# Patient Record
Sex: MALE | Race: WHITE | Employment: OTHER | URBAN - METROPOLITAN AREA
[De-identification: names, ages, dates, MRNs, and addresses within clinical notes are randomized per-mention and may not be internally consistent; named-entity substitution may affect disease eponyms.]

---

## 2018-07-16 ENCOUNTER — APPOINTMENT (EMERGENCY)
Dept: RADIOLOGY | Facility: HOSPITAL | Age: 83
DRG: 481 | End: 2018-07-16
Payer: MEDICARE

## 2018-07-16 ENCOUNTER — APPOINTMENT (EMERGENCY)
Dept: CT IMAGING | Facility: HOSPITAL | Age: 83
DRG: 481 | End: 2018-07-16
Payer: MEDICARE

## 2018-07-16 ENCOUNTER — HOSPITAL ENCOUNTER (INPATIENT)
Facility: HOSPITAL | Age: 83
LOS: 7 days | Discharge: NURSING, REHAB OR LONG-TERM CARE | DRG: 481 | End: 2018-07-23
Attending: HOSPITALIST | Admitting: HOSPITALIST
Payer: MEDICARE

## 2018-07-16 ENCOUNTER — HOSPITAL ENCOUNTER (EMERGENCY)
Facility: HOSPITAL | Age: 83
DRG: 481 | End: 2018-07-16
Attending: EMERGENCY MEDICINE
Payer: MEDICARE

## 2018-07-16 VITALS
TEMPERATURE: 98.9 F | SYSTOLIC BLOOD PRESSURE: 98 MMHG | HEIGHT: 66 IN | RESPIRATION RATE: 18 BRPM | WEIGHT: 175 LBS | OXYGEN SATURATION: 96 % | DIASTOLIC BLOOD PRESSURE: 64 MMHG | BODY MASS INDEX: 28.12 KG/M2 | HEART RATE: 64 BPM

## 2018-07-16 DIAGNOSIS — N17.9 AKI (ACUTE KIDNEY INJURY) (HCC): ICD-10-CM

## 2018-07-16 DIAGNOSIS — S30.22XA SCROTAL HEMATOMA: ICD-10-CM

## 2018-07-16 DIAGNOSIS — S72.141A CLOSED DISPLACED INTERTROCHANTERIC FRACTURE OF RIGHT FEMUR, INITIAL ENCOUNTER (HCC): ICD-10-CM

## 2018-07-16 DIAGNOSIS — S72.001A HIP FRACTURE REQUIRING OPERATIVE REPAIR, RIGHT, CLOSED, INITIAL ENCOUNTER (HCC): Primary | ICD-10-CM
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
## 2018-07-16 DIAGNOSIS — S72.001A CLOSED RIGHT HIP FRACTURE, INITIAL ENCOUNTER (HCC): Primary | ICD-10-CM

## 2018-07-16 PROBLEM — E78.5 DYSLIPIDEMIA: Status: ACTIVE | Noted: 2018-07-16

## 2018-07-16 PROBLEM — F02.80 EARLY ONSET ALZHEIMER'S DEMENTIA WITHOUT BEHAVIORAL DISTURBANCE (HCC): Status: ACTIVE | Noted: 2018-07-16

## 2018-07-16 PROBLEM — Z01.818 PRE-OP EVALUATION: Status: ACTIVE | Noted: 2018-07-16

## 2018-07-16 PROBLEM — D72.829 LEUKOCYTOSIS: Status: ACTIVE | Noted: 2018-07-16

## 2018-07-16 PROBLEM — E03.9 ACQUIRED HYPOTHYROIDISM: Status: ACTIVE | Noted: 2018-07-16

## 2018-07-16 PROBLEM — E87.1 HYPONATREMIA: Status: ACTIVE | Noted: 2018-07-16

## 2018-07-16 PROBLEM — G30.0 EARLY ONSET ALZHEIMER'S DEMENTIA WITHOUT BEHAVIORAL DISTURBANCE (HCC): Status: ACTIVE | Noted: 2018-07-16

## 2018-07-16 LAB
ALBUMIN SERPL BCP-MCNC: 3.8 G/DL (ref 3.5–5.7)
ALP SERPL-CCNC: 122 U/L (ref 55–165)
ALT SERPL W P-5'-P-CCNC: 18 U/L (ref 7–52)
ANION GAP SERPL CALCULATED.3IONS-SCNC: 7 MMOL/L (ref 4–13)
AST SERPL W P-5'-P-CCNC: 20 U/L (ref 13–39)
BASOPHILS # BLD AUTO: 0 THOUSANDS/ΜL (ref 0–0.1)
BASOPHILS NFR BLD AUTO: 0 % (ref 0–1)
BILIRUB SERPL-MCNC: 0.5 MG/DL (ref 0.2–1)
BUN SERPL-MCNC: 20 MG/DL (ref 7–25)
CALCIUM SERPL-MCNC: 9.5 MG/DL (ref 8.6–10.5)
CHLORIDE SERPL-SCNC: 95 MMOL/L (ref 98–107)
CO2 SERPL-SCNC: 28 MMOL/L (ref 21–31)
CREAT SERPL-MCNC: 1.14 MG/DL (ref 0.7–1.3)
EOSINOPHIL # BLD AUTO: 0.1 THOUSAND/ΜL (ref 0–0.61)
EOSINOPHIL NFR BLD AUTO: 1 % (ref 0–6)
ERYTHROCYTE [DISTWIDTH] IN BLOOD BY AUTOMATED COUNT: 14.5 % (ref 11.6–15.1)
GFR SERPL CREATININE-BSD FRML MDRD: 60 ML/MIN/1.73SQ M
GLUCOSE SERPL-MCNC: 176 MG/DL (ref 65–140)
HCT VFR BLD AUTO: 42 % (ref 42–52)
HGB BLD-MCNC: 14.5 G/DL (ref 12–17)
LYMPHOCYTES # BLD AUTO: 0.8 THOUSANDS/ΜL (ref 0.6–4.47)
LYMPHOCYTES NFR BLD AUTO: 5 % (ref 14–44)
MCH RBC QN AUTO: 32 PG (ref 26.8–34.3)
MCHC RBC AUTO-ENTMCNC: 34.4 G/DL (ref 31.4–37.4)
MCV RBC AUTO: 93 FL (ref 82–98)
MONOCYTES # BLD AUTO: 0.7 THOUSAND/ΜL (ref 0.17–1.22)
MONOCYTES NFR BLD AUTO: 5 % (ref 4–12)
NEUTROPHILS # BLD AUTO: 13.2 THOUSANDS/ΜL (ref 1.85–7.62)
NEUTS SEG NFR BLD AUTO: 89 % (ref 43–75)
NRBC BLD AUTO-RTO: 0 /100 WBCS
PLATELET # BLD AUTO: 275 THOUSANDS/UL (ref 149–390)
PMV BLD AUTO: 7.1 FL (ref 8.9–12.7)
POTASSIUM SERPL-SCNC: 4.6 MMOL/L (ref 3.5–5.5)
PROT SERPL-MCNC: 6.6 G/DL (ref 6.4–8.9)
RBC # BLD AUTO: 4.52 MILLION/UL (ref 3.88–5.62)
SODIUM SERPL-SCNC: 130 MMOL/L (ref 134–143)
WBC # BLD AUTO: 14.8 THOUSAND/UL (ref 4.31–10.16)

## 2018-07-16 PROCEDURE — 73552 X-RAY EXAM OF FEMUR 2/>: CPT

## 2018-07-16 PROCEDURE — 99223 1ST HOSP IP/OBS HIGH 75: CPT | Performed by: HOSPITALIST

## 2018-07-16 PROCEDURE — 70450 CT HEAD/BRAIN W/O DYE: CPT

## 2018-07-16 PROCEDURE — 85025 COMPLETE CBC W/AUTO DIFF WBC: CPT | Performed by: EMERGENCY MEDICINE

## 2018-07-16 PROCEDURE — 71250 CT THORAX DX C-: CPT

## 2018-07-16 PROCEDURE — 96375 TX/PRO/DX INJ NEW DRUG ADDON: CPT

## 2018-07-16 PROCEDURE — 96374 THER/PROPH/DIAG INJ IV PUSH: CPT

## 2018-07-16 PROCEDURE — 80053 COMPREHEN METABOLIC PANEL: CPT | Performed by: EMERGENCY MEDICINE

## 2018-07-16 PROCEDURE — 96376 TX/PRO/DX INJ SAME DRUG ADON: CPT

## 2018-07-16 PROCEDURE — 74176 CT ABD & PELVIS W/O CONTRAST: CPT

## 2018-07-16 PROCEDURE — 36415 COLL VENOUS BLD VENIPUNCTURE: CPT | Performed by: EMERGENCY MEDICINE

## 2018-07-16 PROCEDURE — 99285 EMERGENCY DEPT VISIT HI MDM: CPT

## 2018-07-16 RX ORDER — EZETIMIBE 10 MG/1
10 TABLET ORAL DAILY
COMMUNITY

## 2018-07-16 RX ORDER — ASPIRIN 81 MG/1
81 TABLET, CHEWABLE ORAL
COMMUNITY
Start: 2017-12-02

## 2018-07-16 RX ORDER — MORPHINE SULFATE 2 MG/ML
2 INJECTION, SOLUTION INTRAMUSCULAR; INTRAVENOUS EVERY 4 HOURS PRN
Status: DISCONTINUED | OUTPATIENT
Start: 2018-07-16 | End: 2018-07-16 | Stop reason: HOSPADM

## 2018-07-16 RX ORDER — BISACODYL 10 MG
10 SUPPOSITORY, RECTAL RECTAL EVERY 24 HOURS
Status: DISCONTINUED | OUTPATIENT
Start: 2018-07-16 | End: 2018-07-23 | Stop reason: HOSPADM

## 2018-07-16 RX ORDER — HYDROCODONE BITARTRATE AND ACETAMINOPHEN 5; 325 MG/1; MG/1
1 TABLET ORAL EVERY 6 HOURS PRN
Status: DISCONTINUED | OUTPATIENT
Start: 2018-07-16 | End: 2018-07-23 | Stop reason: HOSPADM

## 2018-07-16 RX ORDER — MORPHINE SULFATE 2 MG/ML
2 INJECTION, SOLUTION INTRAMUSCULAR; INTRAVENOUS ONCE
Status: COMPLETED | OUTPATIENT
Start: 2018-07-16 | End: 2018-07-16

## 2018-07-16 RX ORDER — LEVOTHYROXINE SODIUM 88 UG/1
88 TABLET ORAL
COMMUNITY
Start: 2017-12-02

## 2018-07-16 RX ORDER — ONDANSETRON 2 MG/ML
4 INJECTION INTRAMUSCULAR; INTRAVENOUS EVERY 6 HOURS PRN
Status: DISCONTINUED | OUTPATIENT
Start: 2018-07-16 | End: 2018-07-23 | Stop reason: HOSPADM

## 2018-07-16 RX ORDER — DOCUSATE SODIUM 100 MG/1
100 CAPSULE, LIQUID FILLED ORAL 2 TIMES DAILY
Status: DISCONTINUED | OUTPATIENT
Start: 2018-07-16 | End: 2018-07-23 | Stop reason: HOSPADM

## 2018-07-16 RX ORDER — MORPHINE SULFATE 2 MG/ML
2 INJECTION, SOLUTION INTRAMUSCULAR; INTRAVENOUS EVERY 4 HOURS PRN
Status: DISCONTINUED | OUTPATIENT
Start: 2018-07-16 | End: 2018-07-17

## 2018-07-16 RX ORDER — EZETIMIBE 10 MG/1
10 TABLET ORAL DAILY
Status: DISCONTINUED | OUTPATIENT
Start: 2018-07-17 | End: 2018-07-23 | Stop reason: HOSPADM

## 2018-07-16 RX ORDER — SODIUM CHLORIDE 9 MG/ML
75 INJECTION, SOLUTION INTRAVENOUS CONTINUOUS
Status: DISCONTINUED | OUTPATIENT
Start: 2018-07-16 | End: 2018-07-18

## 2018-07-16 RX ORDER — LEVOTHYROXINE SODIUM 88 UG/1
88 TABLET ORAL
Status: DISCONTINUED | OUTPATIENT
Start: 2018-07-17 | End: 2018-07-23 | Stop reason: HOSPADM

## 2018-07-16 RX ORDER — ACETAMINOPHEN 500 MG
500 TABLET ORAL EVERY 6 HOURS
COMMUNITY
Start: 2017-12-02

## 2018-07-16 RX ORDER — ACETAMINOPHEN 325 MG/1
650 TABLET ORAL EVERY 6 HOURS PRN
Status: DISCONTINUED | OUTPATIENT
Start: 2018-07-16 | End: 2018-07-23 | Stop reason: HOSPADM

## 2018-07-16 RX ORDER — BISACODYL 10 MG
10 SUPPOSITORY, RECTAL RECTAL EVERY 24 HOURS
COMMUNITY
Start: 2017-12-02

## 2018-07-16 RX ORDER — ONDANSETRON 2 MG/ML
4 INJECTION INTRAMUSCULAR; INTRAVENOUS ONCE
Status: COMPLETED | OUTPATIENT
Start: 2018-07-16 | End: 2018-07-16

## 2018-07-16 RX ADMIN — SODIUM CHLORIDE 75 ML/HR: 9 INJECTION, SOLUTION INTRAVENOUS at 20:31

## 2018-07-16 RX ADMIN — ONDANSETRON 4 MG: 2 INJECTION, SOLUTION INTRAMUSCULAR; INTRAVENOUS at 15:02

## 2018-07-16 RX ADMIN — MORPHINE SULFATE 2 MG: 2 INJECTION, SOLUTION INTRAMUSCULAR; INTRAVENOUS at 20:46

## 2018-07-16 RX ADMIN — MORPHINE SULFATE 2 MG: 2 INJECTION, SOLUTION INTRAMUSCULAR; INTRAVENOUS at 18:08

## 2018-07-16 RX ADMIN — BISACODYL 10 MG: 10 SUPPOSITORY RECTAL at 20:31

## 2018-07-16 RX ADMIN — MORPHINE SULFATE 2 MG: 2 INJECTION, SOLUTION INTRAMUSCULAR; INTRAVENOUS at 15:03

## 2018-07-16 NOTE — ED PROVIDER NOTES
History  Chief Complaint   Patient presents with    Fall     EMS reports that the pt is a resident at [facility] and that he fell around 1000 today; upon arrival, EMS noticed pt was siting in a wheelchair with staff assisting him with a meal; when getting the pt on the stretcher, they noticed that the pt's right leg was shortened and externally rotated; pt unable to communicate when pain is locatedc     Patient comes to us in the emergency department by EMS with complaint of falling in the shower approximately 10 with 10 30 this morning  Patient is nonverbal and cannot give any reports of pain or review of systems at this time  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 Taking?    Multiple Vitamins-Minerals (MULTIVITAMIN ADULTS PO)   Yes Yes   Sig: Take 1 capsule by mouth   acetaminophen (TYLENOL) 500 mg tablet   Yes Yes   Sig: Take 500 mg by mouth every 6 (six) hours   aspirin 81 mg chewable tablet   Yes Yes   Sig: Chew 81 mg   bisacodyl (DULCOLAX) 10 mg suppository   Yes Yes   Sig: Insert 10 mg into the rectum every 24 hours   ezetimibe (ZETIA) 10 mg tablet   Yes Yes   Sig: Take 10 mg by mouth daily   levothyroxine 88 mcg tablet   Yes Yes   Sig: Take 88 mcg by mouth      Facility-Administered Medications: None       Past Medical History:   Diagnosis Date    Acute kidney failure (HCC)     Cardiac disease     [facility]cere[facility] [address] [facility]     Disease of thyroid gland     hypothyroid    Sacral fracture ([facility] [address] )        History reviewed  No pertinent surgical history  History reviewed  No pertinent family history  I have reviewed and agree with the history as documented  Social History   Substance Use Topics    Smoking status: Unknown If Ever Smoked    Smokeless tobacco: Never Used    Alcohol use No        Review of Systems   Unable to perform ROS: Other (non-verbal following CVA in past)       Physical Exam  Physical Exam   Constitutional: He appears well-developed and well-nourished  HENT:   Nose: Nose normal    Mouth/Throat: Oropharynx is clear and moist  No oropharyngeal exudate  Eyes: Conjunctivae and EOM are normal  Pupils are equal, round, and reactive to light  No scleral icterus  Neck: Normal range of motion  Neck supple  No JVD present  No tracheal deviation present  Cardiovascular: Normal rate, regular rhythm and normal heart sounds  No murmur heard  Pulmonary/Chest: Effort normal and breath sounds normal  No respiratory distress  He has no wheezes  He has no rales  Abdominal: Soft  Bowel sounds are normal  There is no tenderness  There is no guarding  Musculoskeletal: He exhibits edema, tenderness and deformity  Right leg is held in external rotation and flexion and shortened  There is edema of the proximal to mid right thigh with exquisite tenderness with palpation or any attempted range of motion  The right lower leg and foot are neurovascularly intact  Neurological:   The patient is unable to cooperate for a neurologic exam   Skin: Skin is warm and dry  Psychiatric: He has a normal mood and affect  His behavior is normal    Nursing note and vitals reviewed        Vital Signs  ED Triage Vitals [07/16/18 1424]   Temperature Pulse Respirations Blood Pressure SpO2   (!) 95 8 °F (35 4 °C) 66 18 123/71 96 %      Temp Source Heart Rate Source Patient Position - Orthostatic VS BP Location FiO2 (%)   Tympanic Monitor Lying Right arm -- Pain Score       --           Vitals:    07/16/18 1424 07/16/18 1819   BP: 123/71 98/64   Pulse: 66 64   Patient Position - Orthostatic VS: Lying Lying       Visual Acuity      ED Medications  Medications   morphine injection 2 mg (2 mg Intravenous Given 7/16/18 1503)   ondansetron (ZOFRAN) injection 4 mg (4 mg Intravenous Given 7/16/18 1502)   morphine injection 2 mg (2 mg Intravenous Given 7/16/18 1808)       Diagnostic Studies  Results Reviewed     Procedure Component Value Units Date/Time    Comprehensive metabolic panel [[id number]]  (Abnormal) Collected:  07/16/18 1458    Lab Status:  Final result Specimen:  Blood from Arm, Right Updated:  07/16/18 1540     Sodium 130 (L) mmol/L      Potassium 4 6 mmol/L      Chloride 95 (L) mmol/L      CO2 28 mmol/L      Anion Gap 7 mmol/L      BUN 20 mg/dL      Creatinine 1 14 mg/dL      Glucose 176 (H) mg/dL      Calcium 9 5 mg/dL      AST 20 U/L      ALT 18 U/L      Alkaline Phosphatase 122 U/L      Total Protein 6 6 g/dL      Albumin 3 8 g/dL      Total Bilirubin 0 50 mg/dL      eGFR 60 ml/min/1 73sq m     Narrative:         National Kidney Disease Education Program recommendations are as follows:  GFR calculation is accurate only with a steady state creatinine  Chronic Kidney disease less than 60 ml/min/1 73 sq  meters  Kidney failure less than 15 ml/min/1 73 sq  meters      CBC and differential [[id number]]  (Abnormal) Collected:  07/16/18 1458    Lab Status:  Final result Specimen:  Blood from Arm, Right Updated:  07/16/18 1540     WBC 14 80 (H) Thousand/uL      RBC 4 52 Million/uL      Hemoglobin 14 5 g/dL      Hematocrit 42 0 %      MCV 93 fL      MCH 32 0 pg      MCHC 34 4 g/dL      RDW 14 5 %      MPV 7 1 (L) fL      Platelets 367 Thousands/uL      nRBC 0 /100 WBCs      Neutrophils Relative 89 (H) %      Lymphocytes Relative 5 (L) %      Monocytes Relative 5 %      Eosinophils Relative 1 %      Basophils Relative 0 %      Neutrophils Absolute 13 20 (H) Thousands/µL Lymphocytes Absolute 0 80 Thousands/µL      Monocytes Absolute 0 70 Thousand/µL      Eosinophils Absolute 0 10 Thousand/µL      Basophils Absolute 0 00 Thousands/µL     UA w Reflex to Microscopic w Reflex to Culture [[id number]]     Lab Status:  No result Specimen:  Urine                  CT chest abdomen pelvis wo contrast   Final Result by Horace Ragsdale (07/16 1715)   Intertrochanteric fracture of the right femur  No other evidence of acute traumatic injury  Limited without contrast    Cholelithiasis incidentally noted  Signed by SVETA Hopkins       XR femur 2 views RIGHT   Final Result by Nora Paniagua MD (07/16 1709)   Complete comminuted intertrochanteric proximal femoral fracture as   described with mild lateral displacement without dislocation  Signed by Nora Paniagua MD      CT head without contrast   Final Result by Horace Ragsdale (07/16 1657)   No acute intracranial findings  If symptoms persist or if further imaging   is clinically indicated, consider follow-up CT or MRI  Low attenuation in the white matter bilaterally, age indeterminate  This   is most commonly from small vessel ischemic disease         Signed by SVETA Hopkins  Procedures  Procedures       Phone Contacts  ED Phone Contact    ED Course  ED Course as of Jul 16 1839 Mon Jul 16, 2018   1617 XR femur 2 views RIGHT   1622 Right femur x-ray reviewed by me  Order for transfer admission for hip fracture made  We await PAC call  [address] Discussed with Dr Gerhardt Dade at [facility], patient accepted but call made to Dr Simona Dolan, orthopedics, for further sign out  Patient's family member is in the examination room at this time and aware of plan  [phone] CT chest abdomen pelvis wo contrast   1717 Case discussed with Dr Simona Dolan, orthopedics, he will see patient in consult at [facility]  Patient's family is aware      1746 Case discussed with Marko Gandhi, she has a room and will call when transportation arrangements have been made  MDM  CritCare Time    Disposition  Final diagnoses:   Hip fracture requiring operative repair, right, closed, initial encounter [facility])   Fall, initial encounter     Time reflects when diagnosis was documented in both MDM as applicable and the Disposition within this note     Time User Action Codes Description Comment    7/16/2018  5:21 PM Elba Embs Add [S72 001A] Hip fracture requiring operative repair, right, closed, initial encounter ([facility] [address] )     7/16/2018  5:21 PM Elba Embs Add Simeon Esqueda  DANESHEA] Fall, initial encounter       ED Disposition     ED Disposition Condition Comment    Transfer to Another [address] should be transferred out to [facility] in [place], [place]       MD Documentation      Most Recent Value   Patient Condition  The patient has been stabilized such that within reasonable medical probability, no material deterioration of the patient condition or the condition of the unborn child(hugo) is likely to result from the transfer   Reason for Transfer  Level of Care needed not available at this facility   Benefits of Transfer  Specialized equipment and/or services available at the receiving facility (Include comment)________________________ [in-patient service]   Risks of Transfer  Potential for delay in receiving treatment, Potential deterioration of medical condition, Increased discomfort during transfer, Possible worsening of condition or death during transfer, Other: (Include comment)__________________________ [further displacement of the right hip fracture fragments]   Accepting Physician  Dr Sarahy Rodriguez NameJared    (Name & Tel number)  Clever   Sending MD Dr Dyer Breath   Provider Certification  General risk, such as traffic hazards, adverse weather conditions, rough terrain or turbulence, possible failure of equipment (including vehicle or aircraft), or consequences of actions of persons outside the control of the transport personnel      RN Documentation      [facility] [address] [facility], Jared   Bed Assignment  059    (Name & Tel number)  Lucie Manual   Report Given to  [facility]      Follow-up Information    None         Discharge Medication List as of 7/16/2018  6:34 PM      CONTINUE these medications which have NOT CHANGED    Details   acetaminophen (TYLENOL) 500 mg tablet Take 500 mg by mouth every 6 (six) hours, Starting Sat 12/2/2017, Historical Med      aspirin 81 mg chewable tablet Chew 81 mg, Starting Sat 12/2/2017, Historical Med      bisacodyl (DULCOLAX) 10 mg suppository Insert 10 mg into the rectum every 24 hours, Starting Sat 12/2/2017, Historical Med      ezetimibe (ZETIA) 10 mg tablet Take 10 mg by mouth daily, Historical Med      levothyroxine 88 mcg tablet Take 88 mcg by mouth, Starting Sat 12/2/2017, Historical Med      Multiple Vitamins-Minerals (MULTIVITAMIN ADULTS PO) Take 1 capsule by mouth, Starting Sat 12/2/2017, Historical Med           No discharge procedures on file      ED Provider  Electronically Signed by           Lisa Oh DO  07/16/18 2522

## 2018-07-16 NOTE — EMTALA/ACUTE CARE TRANSFER
[address] [place] [address] EMERGENCY DEPARTMENT  [address] [place] [address]  [phone]  Dept: [phone]      [id number] TRANSFER CONSENT    NAME Alejandro SOTO 1935                              MRN [id number]    I have been informed of my rights regarding examination, treatment, and transfer   by Dr Qi Stock DO    Benefits: Specialized equipment and/or services available at the receiving facility (Include comment)________________________ (in-patient service)    Risks: Potential for delay in receiving treatment, Potential deterioration of medical condition, Increased discomfort during transfer, Possible worsening of condition or death during transfer, Other: (Include comment)__________________________ (further displacement of the right hip fracture fragments)      Transfer Request   I acknowledge that my medical condition has been evaluated and explained to me by the emergency department physician or other qualified medical person and/or my attending physician who has recommended and offered to me further medical examination and treatment  I understand the Hospital's obligation with respect to the treatment and stabilization of my emergency medical condition  I nevertheless request to be transferred  I release the Hospital, the doctor, and any other persons caring for me from all responsibility or liability for any injury or ill effects that may result from my transfer and agree to accept all responsibility for the consequences of my choice to transfer, rather than receive stabilizing treatment at the Hospital  I understand that because the transfer is my request, my insurance may not provide reimbursement for the services  The Hospital will assist and direct me and my family in how to make arrangements for transfer, but the hospital is not liable for any fees charged by the transport service    In spite of this understanding, I refuse to consent to further medical examination and treatment which has been offered to me, and request transfer to [address], [address] [facility] in [facility] [place], [place]  I authorize the performance of emergency medical procedures and treatments upon me in both transit and upon arrival at the receiving facility  Additionally, I authorize the release of any and all medical records to the receiving facility and request they be transported with me, if possible  I authorize the performance of emergency medical procedures and treatments upon me in both transit and upon arrival at the receiving facility  Additionally, I authorize the release of any and all medical records to the receiving facility and request they be transported with me, if possible  I understand that the safest mode of transportation during a medical emergency is an ambulance and that the Hospital advocates the use of this mode of transport  Risks of traveling to the receiving facility by car, including absence of medical control, life sustaining equipment, such as oxygen, and medical personnel has been explained to me and I fully understand them  (YOLANDA CORRECT BOX BELOW)  Wynn Miranda  ]  I consent to the stated transfer and to be transported by ambulance/helicopter  [  ]  I consent to the stated transfer, but refuse transportation by ambulance and accept full responsibility for my transportation by car    I understand the risks of non-ambulance transfers and I exonerate the Hospital and its staff from any deterioration in my condition that results from this refusal     X___________________________________________    DATE  18  TIME________  Signature of patient or legally responsible individual signing on patient behalf           RELATIONSHIP TO PATIENT_________________________          Provider Certification    [facility] 1935                              BABITA [id number]    A medical screening exam was performed on the above named patient  Based on the examination:    Condition Necessitating Transfer The primary encounter diagnosis was Hip fracture requiring operative repair, right, closed, initial encounter ([facility] [address] )  A diagnosis of Fall, initial encounter was also pertinent to this visit  Patient Condition: The patient has been stabilized such that within reasonable medical probability, no material deterioration of the patient condition or the condition of the unborn child(hugo) is likely to result from the transfer    Reason for Transfer: Level of Care needed not available at this facility    Transfer Requirements: [address] [facility] in [place], [place]   · [facility] available and qualified personnel available for treatment as acknowledged by Marko Gandhi  · Agreed to accept transfer and to provide appropriate medical treatment as acknowledged by       Dr Gerhardt Dade  · Appropriate medical records of the examination and treatment of the patient are provided at the time of transfer   [address] [place] [address], [address] _______  · Transfer will be performed by qualified personnel from    and appropriate transfer equipment as required, including the use of necessary and appropriate life support measures      Provider Certification: I have examined the patient and explained the following risks and benefits of being transferred/refusing transfer to the patient/family:  General risk, such as traffic hazards, adverse weather conditions, rough terrain or turbulence, possible failure of equipment (including vehicle or aircraft), or consequences of actions of persons outside the control of the transport personnel      Based on these reasonable risks and benefits to the patient and/or the unborn child(hugo), and based upon the information available at the time of the patients examination, I certify that the medical benefits reasonably to be expected from the provision of appropriate medical treatments at another medical facility outweigh the increasing risks, if any, to the individuals medical condition, and in the case of labor to the unborn child, from effecting the transfer      X____________________________________________ DATE 07/16/18        TIME_______      ORIGINAL - SEND TO MEDICAL RECORDS   COPY - SEND WITH PATIENT DURING TRANSFER

## 2018-07-17 ENCOUNTER — APPOINTMENT (INPATIENT)
Dept: ULTRASOUND IMAGING | Facility: HOSPITAL | Age: 83
DRG: 481 | End: 2018-07-17
Payer: MEDICARE

## 2018-07-17 PROBLEM — N17.9 AKI (ACUTE KIDNEY INJURY) (HCC): Status: ACTIVE | Noted: 2018-07-17

## 2018-07-17 PROBLEM — S72.141A CLOSED DISPLACED INTERTROCHANTERIC FRACTURE OF RIGHT FEMUR (HCC): Status: ACTIVE | Noted: 2018-07-16

## 2018-07-17 LAB
ANION GAP SERPL CALCULATED.3IONS-SCNC: 9 MMOL/L (ref 4–13)
BASOPHILS # BLD AUTO: 0 THOUSANDS/ΜL (ref 0–0.1)
BASOPHILS NFR BLD AUTO: 0 % (ref 0–2)
BILIRUB UR QL STRIP: NEGATIVE
BUN SERPL-MCNC: 26 MG/DL (ref 7–25)
CALCIUM SERPL-MCNC: 9.4 MG/DL (ref 8.6–10.5)
CHLORIDE SERPL-SCNC: 98 MMOL/L (ref 98–107)
CLARITY UR: CLEAR
CO2 SERPL-SCNC: 24 MMOL/L (ref 21–31)
COLOR UR: YELLOW
CREAT SERPL-MCNC: 1.58 MG/DL (ref 0.7–1.3)
EOSINOPHIL # BLD AUTO: 0 THOUSAND/ΜL (ref 0–0.61)
EOSINOPHIL NFR BLD AUTO: 0 % (ref 0–5)
ERYTHROCYTE [DISTWIDTH] IN BLOOD BY AUTOMATED COUNT: 14.4 % (ref 11.5–14.5)
GFR SERPL CREATININE-BSD FRML MDRD: 40 ML/MIN/1.73SQ M
GLUCOSE SERPL-MCNC: 142 MG/DL (ref 65–99)
GLUCOSE UR STRIP-MCNC: NEGATIVE MG/DL
HCT VFR BLD AUTO: 34.6 % (ref 36.5–49.3)
HGB BLD-MCNC: 11.6 G/DL (ref 14–18)
HGB UR QL STRIP.AUTO: NEGATIVE
INR PPP: 1.05 (ref 0.9–1.5)
KETONES UR STRIP-MCNC: NEGATIVE MG/DL
LEUKOCYTE ESTERASE UR QL STRIP: NEGATIVE
LYMPHOCYTES # BLD AUTO: 0.7 THOUSANDS/ΜL (ref 0.6–4.47)
LYMPHOCYTES NFR BLD AUTO: 6 % (ref 21–51)
MAGNESIUM SERPL-MCNC: 2 MG/DL (ref 1.9–2.7)
MCH RBC QN AUTO: 30.9 PG (ref 26–34)
MCHC RBC AUTO-ENTMCNC: 33.7 G/DL (ref 31–37)
MCV RBC AUTO: 92 FL (ref 81–99)
MONOCYTES # BLD AUTO: 1.2 THOUSAND/ΜL (ref 0.17–1.22)
MONOCYTES NFR BLD AUTO: 10 % (ref 2–12)
NEUTROPHILS # BLD AUTO: 10.2 THOUSANDS/ΜL (ref 1.4–6.5)
NEUTS SEG NFR BLD AUTO: 84 % (ref 42–75)
NITRITE UR QL STRIP: NEGATIVE
NRBC BLD AUTO-RTO: 0 /100 WBCS
PH UR STRIP.AUTO: 5.5 [PH] (ref 5–8)
PHOSPHATE SERPL-MCNC: 4.4 MG/DL (ref 3–5.5)
PLATELET # BLD AUTO: 229 THOUSANDS/UL (ref 149–390)
PMV BLD AUTO: 7.2 FL (ref 8.6–11.7)
POTASSIUM SERPL-SCNC: 5.2 MMOL/L (ref 3.5–5.5)
PROT UR STRIP-MCNC: NEGATIVE MG/DL
PROTHROMBIN TIME: 12.2 SECONDS (ref 10.1–12.9)
RBC # BLD AUTO: 3.77 MILLION/UL (ref 4.3–5.9)
SODIUM SERPL-SCNC: 131 MMOL/L (ref 134–143)
SP GR UR STRIP.AUTO: 1.02 (ref 1–1.03)
UROBILINOGEN UR QL STRIP.AUTO: 0.2 E.U./DL
WBC # BLD AUTO: 12.1 THOUSAND/UL (ref 4.8–10.8)

## 2018-07-17 PROCEDURE — 99232 SBSQ HOSP IP/OBS MODERATE 35: CPT | Performed by: NURSE PRACTITIONER

## 2018-07-17 PROCEDURE — 81003 URINALYSIS AUTO W/O SCOPE: CPT | Performed by: HOSPITALIST

## 2018-07-17 PROCEDURE — 87086 URINE CULTURE/COLONY COUNT: CPT | Performed by: HOSPITALIST

## 2018-07-17 PROCEDURE — 85025 COMPLETE CBC W/AUTO DIFF WBC: CPT | Performed by: HOSPITALIST

## 2018-07-17 PROCEDURE — 76770 US EXAM ABDO BACK WALL COMP: CPT

## 2018-07-17 PROCEDURE — 85610 PROTHROMBIN TIME: CPT | Performed by: HOSPITALIST

## 2018-07-17 PROCEDURE — 80048 BASIC METABOLIC PNL TOTAL CA: CPT | Performed by: HOSPITALIST

## 2018-07-17 PROCEDURE — 84100 ASSAY OF PHOSPHORUS: CPT | Performed by: HOSPITALIST

## 2018-07-17 PROCEDURE — 83735 ASSAY OF MAGNESIUM: CPT | Performed by: HOSPITALIST

## 2018-07-17 RX ORDER — MORPHINE SULFATE 2 MG/ML
1 INJECTION, SOLUTION INTRAMUSCULAR; INTRAVENOUS
Status: DISCONTINUED | OUTPATIENT
Start: 2018-07-17 | End: 2018-07-18

## 2018-07-17 RX ADMIN — DOCUSATE SODIUM 100 MG: 100 CAPSULE, LIQUID FILLED ORAL at 17:54

## 2018-07-17 RX ADMIN — BISACODYL 10 MG: 10 SUPPOSITORY RECTAL at 22:11

## 2018-07-17 RX ADMIN — MORPHINE SULFATE 2 MG: 2 INJECTION, SOLUTION INTRAMUSCULAR; INTRAVENOUS at 14:58

## 2018-07-17 RX ADMIN — MORPHINE SULFATE 2 MG: 2 INJECTION, SOLUTION INTRAMUSCULAR; INTRAVENOUS at 11:24

## 2018-07-17 RX ADMIN — SODIUM CHLORIDE 75 ML/HR: 9 INJECTION, SOLUTION INTRAVENOUS at 10:10

## 2018-07-17 RX ADMIN — MORPHINE SULFATE 1 MG: 2 INJECTION, SOLUTION INTRAMUSCULAR; INTRAVENOUS at 22:20

## 2018-07-17 NOTE — PHYSICIAN ADVISOR
Current patient class: Inpatient  The patient is currently on Hospital Day: 2 at [address]      The patient was admitted to the hospital at [address] [facility] on 7/16/18 for the following diagnosis:  Closed displaced intertrochanteric fracture of right femur, initial encounter ([facility] [address] ) [employer]       There is documentation in the medical record of an expected length of stay of at least 2 midnights  The patient is therefore expected to satisfy the 2 midnight benchmark and given the 2 midnight presumption is appropriate for INPATIENT ADMISSION  Given this expectation of a satisfying stay, CMS instructs us that the patient is most often appropriate for inpatient admission under part A provided medical necessity is documented in the chart  After review of the relevant documentation, labs, vital signs and test results, the patient is appropriate for INPATIENT ADMISSION  Admission to the hospital as an inpatient is a complex decision making process which requires the practitioner to consider the patients presenting complaint, history and physical examination and all relevant testing  With this in mind, in this case, the patient was deemed appropriate for INPATIENT ADMISSION  After review of the documentation and testing available at the time of the admission I concur with this clinical determination of medical necessity  Rationale is as follows: The patient is a 82 yrs old Male who presented to the ED at 7/16/2018  7:28 PM with a chief complaint of No chief complaint on file  The patient presented with a right hip fracture  The patient has a history of advanced Alzheimer's dementia and is alert and oriented times 0  The patient is nonverbal at baseline  The patient is being admitted status post fall with a closed right hip fracture  The plan of care includes orthopedic surgery, pain management, patient will need surgical intervention, repeat labs    This patient is appropriate for inpatient admission; continued hospitalization is necessary to ensure stabilization of his clinical status  The patients vitals on arrival were ED Triage Vitals [07/16/18 1900]   Temperature Pulse Respirations Blood Pressure SpO2   (!) 95 °F (35 °C) 64 18 (!) 88/60 90 %      Temp Source Heart Rate Source Patient Position - Orthostatic VS BP Location FiO2 (%)   Tympanic Monitor Lying Left arm --      Pain Score       No Pain           Past Medical History:   Diagnosis Date    Acute kidney failure (HCC)     Cardiac disease     CVA (cerebral vascular accident) ([facility] [address] )     Disease of thyroid gland     hypothyroid    Sacral fracture ([facility] [address] )      History reviewed  No pertinent surgical history          Consults have been placed to:   IP CONSULT TO PHYSICAL MEDICINE REHAB  IP CONSULT TO CASE MANAGEMENT  IP CONSULT TO ORTHOPEDIC SURGERY  IP CONSULT TO CASE MANAGEMENT    Vitals:    07/16/18 2315 07/16/18 2320 07/17/18 0650 07/17/18 1513   BP:  (!) 85/57 (!) 84/60 122/84   BP Location:  Left arm Left arm Right arm   Pulse:  64 79 88   Resp:  20 18 18   Temp: (!) 95 9 °F (35 5 °C) (!) 96 7 °F (35 9 °C) (!) 96 3 °F (35 7 °C) (!) 96 6 °F (35 9 °C)   TempSrc: Tympanic Tympanic Tympanic Tympanic   SpO2:  90% 92% (!) 82%   Weight:       Height:           Most recent labs:    Recent Labs      07/16/18   1458  07/17/18   0624   WBC  14 80*  12 10*   HGB  14 5  11 6*   HCT  42 0  34 6*   PLT  275  229   K  4 6  5 2   NA  130*  131*   CALCIUM  9 5  9 4   BUN  20  26*   CREATININE  1 14  1 58*   INR   --   1 05   AST  20   --    ALT  18   --    ALKPHOS  122   --    BILITOT  0 50   --        Scheduled Meds:  Current Facility-Administered Medications:  acetaminophen 650 mg Oral Q6H PRN James Emery MD    bisacodyl 10 mg Rectal Q24H James Emery MD    [START ON 7/18/2018] cefazolin 1,000 mg Intravenous Once Ingridri Clementinaping,     docusate sodium 100 mg Oral BID James Emery MD    ezetimibe 10 mg Oral Daily Brandy Najera MD    HYDROcodone-acetaminophen 1 tablet Oral Q6H PRN Brandy Najera MD    levothyroxine 88 mcg Oral Early Morning Brandy Najera MD    morphine injection 1 mg Intravenous Q3H PRN MESSI Collins    multivitamin-minerals 1 tablet Oral Daily Brandy Najera MD    ondansetron 4 mg Intravenous Q6H PRN Brandy Najera MD    sodium chloride 125 mL/hr Intravenous Continuous MESSI Collins Last Rate: 75 mL/hr (07/17/18 1010)     Continuous Infusions:  sodium chloride 125 mL/hr Last Rate: 75 mL/hr (07/17/18 1010)     PRN Meds:   acetaminophen    HYDROcodone-acetaminophen    morphine injection    ondansetron    Surgical procedures (if appropriate):  Procedure(s):  INSERTION NAIL IM FEMUR ANTEGRADE (TROCHANTERIC)

## 2018-07-17 NOTE — H&P
H&P- Kinsey Day 1935, 82 y o  male MRN: [id number]    Unit/Bed#: -02 Encounter: [id number]    Primary Care Provider: Lisandro Valdovinos MD   Date and time admitted to hospital: 7/16/2018  7:28 PM    [address] [place] [address]    · Patient is status post mechanical fall prior to admission  · He has a resultant hip fracture as outlined below  · CT scan of the head was within normal limits  · No further workup        * Closed right hip fracture [facility])   Assessment & Plan    · Patient has an acute intertrochanteric hip fracture of the proximal femur  · Admitted to med surg  · Consult Orthopedics  · Pain management - Tylenol for mild pain, Norco for moderate pain, IV morphine for severe pain  · Case was discussed with Dr Live Knight -tentatively scheduled for a hip repair on Wednesday July 18, 2018         Pre-op evaluation   Assessment & Plan    · From a preop risk stratification standpoint patient is at moderate to high risk for a diana operative cardiac event  · Risk factors include, history of previous CVAs, end-stage Alzheimer's dementia, poor baseline ambulatory dysfunction as well as other comorbid medical conditions  · Case was discussed with his wife at bedside  · She understands the risks but would like to proceed with the surgery and goes on to state that the patient is a full DNR/ DNI  · I did relay this information to Dr Live Knight        Acquired hypothyroidism   Assessment & Plan    · Continue Synthroid        Dyslipidemia   Assessment & Plan    · Continue Zetia        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 behavioral disturbance   Assessment & Plan    · Patient at baseline is alert, awake, and  oriented x 0  · Wife also reports he is nonverbal          Leukocytosis   Assessment & Plan    · Most likely reactive  · Will monitor with daily CBCs        Hyponatremia   Assessment & Plan    · Will give some IV fluids and re-evaluate the sodium level in the morning            VTE Prophylaxis: Enoxaparin (Lovenox)  / sequential compression device   Code Status:   DNR DNI level 3  POLST: POLST form is on file already (pre-hospital)  Discussion with family:  Patient's wife whose name is Marco Tsai and phone #[phone] was present at bedside at the time of my evaluation and provided all history    Anticipated Length of Stay:  Patient will be admitted on an Inpatient basis with an anticipated length of stay of greater than 2 midnights  Justification for Hospital Stay:  Right hip repair     Total Time for Visit, including Counseling / Coordination of Care: 30 minutes  Greater than 50% of this total time spent on direct patient counseling and coordination of care  Chief Complaint:  Right hip fracture x1 day    History of Present Illness:    Tami Ribeiro is a 82 y o  male who presents with with a right hip fracture  First and foremost, it must be noted that all history was obtained from the patient's wife who was bedside at the time of my evaluation  Patient's wife states that the patient lives at a nursing home that specializes in patients with advanced Alzheimer's dementia  The patient is AAO x 0 because of the dementia  He is additionally nonverbal at baselinebecause of previous strokes  The patient's wife tells me that the girl at the nursing home took him for a shower  She asked him to hold onto the shower safety bar  The girl turned away for a second and the patient reportedly fell  He fell on his back and hit his head  After that he was in excruciating pain  The exact specifics of the pain are unobtainable  Patient was sent to the emergency room and by imaging was found to have an acute inter trochanteric fracture of the right femur  He has been admitted for the same  No additional details are available at this time  Review of Systems:    Review of Systems   Reason unable to perform ROS: Patient has advanced Alzheimer's dementia and is nonverbal at baseline         Past Medical and Surgical History:     Past Medical History:   Diagnosis Date    [facility] failure [facility] [address] )     Cardiac disease     CVA [facility]cerebral vascular accident) ([facility] [address] )     Disease of thyroid gland     hypothyroid    Sacral fracture (HCC)        No past surgical history on file  Meds/Allergies:    Prior to Admission medications    Medication Sig Start Date End Date Taking? Authorizing Provider   acetaminophen (TYLENOL) 500 mg tablet Take 500 mg by mouth every 6 (six) hours 12/2/17   Historical Provider, MD   aspirin 81 mg chewable tablet Chew 81 mg 12/2/17   Historical Provider, MD   bisacodyl (DULCOLAX) 10 mg suppository Insert 10 mg into the rectum every 24 hours 12/2/17   Historical Provider, MD   ezetimibe (ZETIA) 10 mg tablet Take 10 mg by mouth daily    Historical Provider, MD   levothyroxine 88 mcg tablet Take 88 mcg by mouth 12/2/17   Historical Provider, MD   Multiple Vitamins-Minerals (MULTIVITAMIN ADULTS PO) Take 1 capsule by mouth 12/2/17   Historical Provider, MD     I have reviewed home medications with patient family member  Allergies: Allergies   Allergen Reactions    Corticosteroids     Prednisone Other (See Comments)     Dizziness    Sulfa Antibiotics Fever       Social History:     Marital Status: /Civil Union   Occupation:  Retired and disabled  Patient Pre-hospital Living Situation:  Lives at the nursing home that specializes in dementia  Patient Pre-hospital Level of Mobility:  Reportedly independent  Patient Pre-hospital Diet Restrictions:  None  Substance Use History:   History   Alcohol Use No     History   Smoking Status    Unknown If Ever Smoked   Smokeless Tobacco    Never Used     History   Drug Use No       Family History:    No family history on file  Physical Exam:     Vitals:        Physical Exam   Constitutional: He appears well-developed and well-nourished  HENT:   Head: Normocephalic and atraumatic     Nose: Nose normal    Mouth/Throat: Oropharynx is clear and moist    Eyes: Conjunctivae and EOM are normal  Pupils are equal, round, and reactive to light  Neck: Normal range of motion  Neck supple  No JVD present  No thyromegaly present  Cardiovascular: Normal rate, regular rhythm and intact distal pulses  Exam reveals no gallop and no friction rub  No murmur heard  Pulmonary/Chest: Effort normal and breath sounds normal  No respiratory distress  Abdominal: Soft  Bowel sounds are normal  He exhibits no distension and no mass  There is no tenderness  There is no guarding  Musculoskeletal: Normal range of motion  He exhibits no edema  His right lower extremity is contracted and inverted medially   Lymphadenopathy:     He has no cervical adenopathy  Neurological: No cranial nerve deficit  Patient is at baseline alert awake oriented times 0  At baseline he is also nonverbal  Currently he is very lethargic because he is under the effect of IV pain medications   Skin: Skin is warm  No rash noted  No erythema  Psychiatric: He has a normal mood and affect  His behavior is normal    Vitals reviewed  Additional Data:     Lab Results: I have personally reviewed pertinent reports  Results from last 7 days  Lab Units 07/16/18  1458   WBC Thousand/uL 14 80*   HEMOGLOBIN g/dL 14 5   HEMATOCRIT % 42 0   PLATELETS Thousands/uL 275   NEUTROS PCT % 89*   LYMPHS PCT % 5*   MONOS PCT % 5   EOS PCT % 1       Results from last 7 days  Lab Units 07/16/18  1458   SODIUM mmol/L 130*   POTASSIUM mmol/L 4 6   CHLORIDE mmol/L 95*   CO2 mmol/L 28   BUN mg/dL 20   CREATININE mg/dL 1 14   CALCIUM mg/dL 9 5   TOTAL PROTEIN g/dL 6 6   BILIRUBIN TOTAL mg/dL 0 50   ALK PHOS U/L 122   ALT U/L 18   AST U/L 20   GLUCOSE RANDOM mg/dL 176*                   Imaging: I have personally reviewed pertinent reports        No orders to display       EKG, Pathology, and Other Studies Reviewed on Admission:   · EKG:  None    [employer] Records Reviewed: Yes     ** Please Note: This note has been constructed using a voice recognition system   **

## 2018-07-17 NOTE — ASSESSMENT & PLAN NOTE
· Patient has an acute intertrochanteric hip fracture of the proximal femur  · Admitted to med surg  · Consult Orthopedics  · Pain management - Tylenol for mild pain, Norco for moderate pain, IV morphine for severe pain  · Case was discussed with Dr Ana Peralta -tentatively scheduled for a hip repair on Wednesday July 18, 2018

## 2018-07-17 NOTE — SOCIAL WORK
CM met with pt, wife Holly Massey and son Archie Mcknight at bedside  Pt unable to participate in CM evaluation due to history of Dementia  Pt lives at [address] [place] [address] unit  Pt will require short term rehab in an acute rehab setting or SNF  Referral made to ARU, family in agreement with same  If ARU unable to accommodate pt CM discussed referrals to SNF  Family in agrerment and requested referral to [place] and [facility], CM to make referrals to same as requested  CM discussed pt with Timi Kearney, Director at [facility]  They will hold pt bed and as per family pot will ultimately return to [facility] upon discharge  CM will continue to follow  CM reviewed d/c planning process including the following: identifying help at home, patient preference for d/c planning needs, availability of treatment team to discuss questions or concerns patient and/or family may have regarding understanding medications and recognizing signs and symptoms once discharged  CM also encouraged patient to follow up with all recommended appointments after discharge  Patient advised of importance for patient and family to participate in managing patients medical well being

## 2018-07-17 NOTE — CONSULTS
Consultation - Orthopedics   Ulisses Stovall 82 y o  male MRN: [id number]  Unit/Bed#: -01 Encounter: [id number]      Assessment/Plan     Assessment:  Intertrochanteric fracture right hip  Plan: Will need operative intervention  Consent will be obtained from the wife  Risk benefits and complications to be discussed with her in great detail including bleeding, infection, blood clots, pain, stiffness, nerve damage, RSD, the need for further surgeries, nonunion, malunion, the possibility of loss of life or limb, including but not limited to heart attack or stroke  NPO after midnight  Cat's traction     History of Present Illness   Physician Requesting Consult: Sharita Fisher MD  Reason for Consult / Principal Problem:  Fracture right hip  HPI: Ulisses Stovall is a 82y o  year old male who presents   From a nursing home after falling yesterday  He went to the emergency room at [facility] where x-rays were taken and was transferred to [facility] with a  diagnosis of an intertrochanteric fracture of the right hip  The patient has advanced dementia and is nonverbal   The history was obtained from the medical records  Inpatient consult to Orthopedic Surgery  Consult performed by: Naveed Reid ordered by: Gracy Barron          Review of Systems    Historical Information   Past Medical History:   Diagnosis Date    [facility] failure [facility] [address] )     Cardiac disease     CVA (cerebral vascular accident) ([facility] [address] [facility]     Disease of thyroid gland     hypothyroid    Sacral fracture ([facility] [address] )      No past surgical history on file    Social History   History   Alcohol Use No     History   Drug Use No     History   Smoking Status    Unknown If Ever Smoked   Smokeless Tobacco    Never Used     Family History: non-contributory    Meds/Allergies   all current active meds have been reviewed  Allergies   Allergen Reactions    Corticosteroids     Prednisone Other (See Comments)     Dizziness    Sulfa Antibiotics Fever       Objective   Vitals: Blood pressure (!) 84/60, pulse 79, temperature (!) 96 3 °F (35 7 °C), temperature source Tympanic, resp  rate 18, height 5' 7" (1 702 m), weight 62 2 kg (137 lb 3 2 oz), SpO2 92 %  ,Body mass index is 21 49 kg/m²  Intake/Output Summary (Last 24 hours) at 07/17/18 0832  Last data filed at 07/16/18 2031   Gross per 24 hour   Intake             1000 ml   Output                0 ml   Net             1000 ml     I/O last 24 hours: In: 1000 [I V :1000]  Out: -     Invasive Devices     Peripheral Intravenous Line            Peripheral IV 07/16/18 Right Arm less than 1 day                Physical Exam  Ortho Exam     Patient was awake,  but not oriented to person, place, and time  His spine is midline without any tenderness  There is a negative straight leg and negative contralateral straight leg raising test   The right lower extremity was neurovascularly intact  The toes are pink and mobile  The compartments are soft  The leg was shortened and externally rotated  There is tenderness along the hip and groin and decreased range of motion secondary to pain  There is negative Homans  Lab Results: I have personally reviewed pertinent lab results  Imaging Studies: I have personally reviewed pertinent reports  EKG, Pathology, and Other Studies: I have personally reviewed pertinent reports  VTE Prophylaxis: Sequential compression device Leidy Casa)     Code Status: Level 3 - DNAR and DNI  Advance Directive and Living Will:      Power of :    POLST:      Counseling / Coordination of Care  Total floor / unit time spent today 30 minutes  Greater than 50% of total time was spent with the patient and / or family counseling and / or coordination of care   A description of the counseling / coordination of care:

## 2018-07-17 NOTE — ASSESSMENT & PLAN NOTE
· From a preop risk stratification standpoint patient is at moderate to high risk for a diana operative cardiac event  · Risk factors include, history of previous CVAs, end-stage Alzheimer's dementia, poor baseline ambulatory dysfunction as well as other comorbid medical conditions  · The case was discussed with his wife at admission by admitting physician  · She understands the risks but would like to proceed with the surgery and goes on to state that the patient is a DNR/ DNI

## 2018-07-17 NOTE — ASSESSMENT & PLAN NOTE
· Patient has an acute intertrochanteric hip fracture of the proximal femur  · Admitted to med surg  · Consult Orthopedics  · Pain management - Tylenol for mild pain, Norco for moderate pain, IV morphine for severe pain  · Case was discussed with Dr Juan Olivier -tentatively scheduled for a hip repair on Wednesday July 18, 2018

## 2018-07-17 NOTE — ASSESSMENT & PLAN NOTE
· Patient is status post mechanical fall prior to admission  · He has a resultant hip fracture as outlined below  · CT scan of the head was within normal limits  · No further workup

## 2018-07-17 NOTE — PLAN OF CARE
Problem: Nutrition/Hydration-ADULT  Goal: Nutrient/Hydration intake appropriate for improving, restoring or maintaining nutritional needs  Monitor and assess patient's nutrition/hydration status for malnutrition (ex- brittle hair, bruises, dry skin, pale skin and conjunctiva, muscle wasting, smooth red tongue, and disorientation)  Collaborate with interdisciplinary team and initiate plan and interventions as ordered  Monitor patient's weight and dietary intake as ordered or per policy  Utilize nutrition screening tool and intervene per policy  Determine patient's food preferences and provide high-protein, high-caloric foods as appropriate  INTERVENTIONS:  - Monitor oral intake, urinary output, labs, and treatment plans  - Assess nutrition and hydration status and recommend course of action  - Evaluate amount of meals eaten  - Assist patient with eating if necessary   - Allow adequate time for meals  - Recommend/ encourage appropriate diets, oral nutritional supplements, and vitamin/mineral supplements  - Order, calculate, and assess calorie counts as needed  - Recommend, monitor, and adjust tube feedings and TPN/PPN based on assessed needs  - Assess need for intravenous fluids  - Provide specific nutrition/hydration education as appropriate  - Include patient/family/caregiver in decisions related to nutrition  Outcome: Not Progressing      Comments: Pt is npo for procedure  Recommend swallow eval when pt resumes po status

## 2018-07-17 NOTE — ASSESSMENT & PLAN NOTE
· From a preop risk stratification standpoint patient is at moderate to high risk for a diana operative cardiac event  · Risk factors include, history of previous CVAs, end-stage Alzheimer's dementia, poor baseline ambulatory dysfunction as well as other comorbid medical conditions  · Case was discussed with his wife at bedside  · She understands the risks but would like to proceed with the surgery and goes on to state that the patient is a full DNR/ DNI  · I did relay this information to Dr Gary Cortes

## 2018-07-17 NOTE — PROGRESS NOTES
Dr Clive Virgen in  Stated he attempted to call wife to sign consent for surgery on 7/18/18 but her phone was constantly busy  She signed consent when she arrived later  5lb [place] traction initiated to RLE  16F garza placed after pt unable to void  Morphine given when pt appeared restless and he slept later

## 2018-07-17 NOTE — ASSESSMENT & PLAN NOTE
· Patient at baseline is alert, awake, and  oriented x 0  · Wife also reports he is nonverbal  · Continue supportive care

## 2018-07-17 NOTE — CASE MANAGEMENT
Initial Clinical Review    Admission: Date/Time/Statement:   7/16/18 AT [facility] 122 HOSP ED TO ADRY JE[place] 2ND FALL WITH ACUTE INTERTROCHANTERIC RIGHT HIP FRACTURE - FOR SURGICAL REPAIR    Orders Placed This Encounter   Procedures    Inpatient Admission     Standing Status:   Standing     Number of Occurrences:   1     Order Specific Question:   Admitting Physician     Answer:   Deepthi Burrell [[id number]]     Order Specific Question:   Level of Care     Answer:   Med Surg [[id number]]     Order Specific Question:   Estimated length of stay     Answer:   More than 2 Midnights     Order Specific Question:   Certification     Answer:   I certify that inpatient services are medically necessary for this patient for a duration of greater than two midnights  See H&P and MD Progress Notes for additional information about the patient's course of treatment  Date/Time/Mode of Arrival: 7/16/18  [address] ED TO ADRY JE[place] 2ND FALL WITH ACUTE INTERTROCHANTERIC RIGHT HIP FRACTURE - FOR SURGICAL REPAIR      Chief Complaint:    Right hip fracture x1 day     History of Present Illness:   Carl Sorensen is a 82 y o  male who presents with with a right hip fracture  First and foremost, it must be noted that all history was obtained from the patient's wife who was bedside at the time of my evaluation  Patient's wife states that the patient lives at a nursing home that specializes in patients with advanced Alzheimer's dementia  The patient is AAO x 0 because of the dementia  He is additionally nonverbal at baselinebecause of previous strokes  The patient's wife tells me that the girl at the nursing home took him for a shower  She asked him to hold onto the shower safety bar  The girl turned away for a second and the patient reportedly fell  He fell on his back and hit his head  After that he was in excruciating pain   The exact specifics of the pain are unobtainable  Patient was sent to the emergency room and by imaging was found to have an acute inter trochanteric fracture of the right femur  He has been admitted for the same  No additional details are available at this time      Review of Systems:   Reason unable to perform ROS: Patient has advanced Alzheimer's dementia and is nonverbal at baseline      Physical Exam   Constitutional: He appears well-developed and well-nourished  Cardiovascular: Normal rate, regular rhythm and intact distal pulses  Exam reveals no gallop and no friction rub  Pulmonary/Chest: Effort normal and breath sounds normal  No respiratory distress  Abdominal: Soft  Bowel sounds are normal  He exhibits no distension and no mass  There is no tenderness  There is no guarding  Musculoskeletal: Normal range of motion  He exhibits no edema  His right lower extremity is contracted and inverted medially   Neurological: No cranial nerve deficit     Patient is at baseline alert awake oriented times 0  At baseline he is also nonverbal  Currently he is very lethargic because he is under the effect of IV pain medications       ED Vital Signs:   ED Triage Vitals [07/16/18 1900]   Temperature Pulse Respirations Blood Pressure SpO2   (!) 95 °F (35 °C) 64 18 (!) 88/60 90 %      Temp Source Heart Rate Source Patient Position - Orthostatic VS BP Location FiO2 (%)   Tympanic Monitor Lying Left arm --      Pain Score       No Pain        Wt Readings from Last 1 Encounters:   07/16/18 62 2 kg (137 lb 3 2 oz)      07/16 0701  07/17 0700 07/17 0701  07/17 1431  Most Recent    Temperature (°F) 9598 9    96 3 (35 7)    Pulse 6479    79    Respirations 1820    18    Blood Pressure 84/60123/71    84/60    SpO2 (%) 9096    92          LABS/Diagnostic Test Results:   CBC  Results from last 7 days  Lab Units 07/16/18  1458   WBC Thousand/uL 14 80*   HEMOGLOBIN g/dL 14 5   HEMATOCRIT % 42 0   PLATELETS Thousands/uL 275   NEUTROS PCT % 89* LYMPHS PCT % 5*   MONOS PCT % 5   EOS PCT % 1       CMP  Results from last 7 days  Lab Units 07/16/18  1458   SODIUM mmol/L 130*   POTASSIUM mmol/L 4 6   CHLORIDE mmol/L 95*   CO2 mmol/L 28   BUN mg/dL 20   CREATININE mg/dL 1 14   CALCIUM mg/dL 9 5   TOTAL PROTEIN g/dL 6 6   BILIRUBIN TOTAL mg/dL 0 50   ALK PHOS U/L 122   ALT U/L 18   AST U/L 20       RIGHT HIP X RAY -  Complete comminuted mildly laterally displaced intratrochanteric  fracture is noted of the right proximal femur  No dislocation of the  femoral head from the bony acetabulum  High riding proximal femoral  metadiaphysis overlies the femoral neck with right angle formed due to  rotation between the femoral head/neck and high riding metaphysis  One of  the larger bony fragments includes the lesser trochanter approximate 6 2 x  3 7 cm  CT ABDOMEN PELVIS -  Intertrochanteric fracture of the right femur  No other evidence of acute traumatic injury   Limited without contrast   Cholelithiasis incidentally noted        [facility] ED Medications  Medications   morphine injection 2 mg (2 mg Intravenous Given 7/16/18 1503)   ondansetron (ZOFRAN) injection 4 mg (4 mg Intravenous Given 7/16/18 1502)   morphine injection 2 mg (2 mg Intravenous Given 7/16/18 1808)       Past Medical/Surgical History:   Resolved Ambulatory Problems     Diagnosis Date Noted    No Resolved Ambulatory Problems     Past Medical History:   Diagnosis Date    Acute [facility] failure ([facility] )     Cardiac disease     CVA [facility]cerebral vascular accident) ([facility] )     Disease of thyroid gland     Sacral fracture (HCC)        Admitting Diagnosis: Closed displaced intertrochanteric fracture of right femur, [facility] Yoana March    Age/Sex: 82 y o  male      Assessment/Plan:   Fall   Assessment & Plan     · Patient is status post mechanical fall prior to admission  · He has a resultant hip fracture as outlined below  · CT scan of the head was within normal limits  · No further workup          * Closed right hip fracture [facility])   Assessment & Plan     · Patient has an acute intertrochanteric hip fracture of the proximal femur  · Admitted to med surg  · Consult Orthopedics  · Pain management - Tylenol for mild pain, Norco for moderate pain, IV morphine for severe pain  · Case was discussed with Dr Greg Campbell -tentatively scheduled for a hip repair on Wednesday July 18, 2018           Pre-op evaluation   Assessment & Plan     · From a preop risk stratification standpoint patient is at moderate to high risk for a diana operative cardiac event  · Risk factors include, history of previous CVAs, end-stage Alzheimer's dementia, poor baseline ambulatory dysfunction as well as other comorbid medical conditions  · Case was discussed with his wife at bedside  · She understands the risks but would like to proceed with the surgery and goes on to state that the patient is a full DNR/ DNI  · I did relay this information to Dr Greg Campbell          Acquired hypothyroidism   Assessment & Plan     · Continue Synthroid          Dyslipidemia   Assessment & Plan     · Continue Zetia          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 behavioral disturbance   Assessment & Plan     · Patient at baseline is alert, awake, and  oriented x 0  · Wife also reports he is nonverbal                 Leukocytosis   Assessment & Plan     · Most likely reactive  · Will monitor with daily CBCs          Hyponatremia   Assessment & Plan     · Will give some IV fluids and re-evaluate the sodium level in the morning              VTE Prophylaxis: Enoxaparin (Lovenox)  / sequential compression device   Code Status:   DNR DNI level 3     Anticipated Length of Stay:  Patient will be admitted on an Inpatient basis with an anticipated length of stay of greater than 2 midnights       Justification for Hospital Stay:  Right hip repair         Admission Orders:   7/16/18 AT 1939  ADMIT INPATIENT  VS Q4HRS    BEDREST   BUCKS TRACTION 5 LBS TO RLE  SCD    Regular House Diet    Continuous IV Infusions:   sodium chloride 75 mL/hr Last Rate: 75 mL/hr (07/17/18 1010)       Scheduled Meds:   Current Facility-Administered Medications:  acetaminophen 650 mg Oral Q6H PRN Chey Ha MD    bisacodyl 10 mg Rectal Q24H Chey Ha MD    [START ON 7/18/2018] cefazolin 1,000 mg Intravenous Once Carlo Mcdowell DO    docusate sodium 100 mg Oral BID Chey Ha MD    ezetimibe 10 mg Oral Daily Chey Ha MD    HYDROcodone-acetaminophen 1 tablet Oral Q6H PRN Chey Ha MD    levothyroxine 88 mcg Oral Early Morning Chey Ha MD    morphine injection 2 mg Intravenous Q4H PRN Chey Ha MD    multivitamin-minerals 1 tablet Oral Daily Chey Ha MD    ondansetron 4 mg Intravenous Q6H PRN Chey Ha MD    sodium chloride 75 mL/hr Intravenous Continuous Chey Ha MD Last Rate: 75 mL/hr (07/17/18 1010)       PRN Meds:    acetaminophen    HYDROcodone-acetaminophen    IV morphine injection 2 mg q4hrs prn given x 2    ondansetron    CONSULT ORTHO    PT EVAL    OT EVAL       Orthopedics  Consults Date of Service: 7/17/2018  8:32 AM     Assessment/Plan :   Assessment:  Intertrochanteric fracture right hip    Plan: Will need operative intervention  Consent will be obtained from the wife    Risk benefits and complications to be discussed with her in great detail including bleeding, infection, blood clots, pain, stiffness, nerve damage, RSD, the need for further surgeries, nonunion, malunion, the possibility of loss of life or limb, including but not limited to heart attack or stroke  NPO after midnight 7/17/18  Cat's traction

## 2018-07-17 NOTE — ASSESSMENT & PLAN NOTE
· Suspected this to be secondary to volume depletion  I will increase rate for IV fluid and will follow up with sodium level in the gianni Paige Settle

## 2018-07-17 NOTE — PHYSICAL THERAPY NOTE
PHYSICAL THERAPY NOTE    Patient Name: Muna Comer  MVEEI'N Date: 7/17/2018  PT consult received and chart reviewed in EMR  Will await PT assessment following orthopedic consult and potential surgical intervention to complete functional tasks and mobilize the patient as tolerated    Francis Smith PT

## 2018-07-17 NOTE — ASSESSMENT & PLAN NOTE
· Patient is status post mechanical fall prior to admission  · He has a resultant hip fracture as outlined above  · CT scan of the head was within normal limits  · No further workup

## 2018-07-17 NOTE — ASSESSMENT & PLAN NOTE
· Orthopedics input is appreciated  The patient is scheduled for hip repair on 07/18/2018    · Continue with Pain management - Tylenol for mild pain, Norco for moderate pain, IV morphine for severe pain

## 2018-07-17 NOTE — PLAN OF CARE
Problem: DISCHARGE PLANNING - CARE MANAGEMENT  Goal: Discharge to post-acute care or home with appropriate resources  INTERVENTIONS:  - Conduct assessment to determine patient/family and health care team treatment goals, and need for post-acute services based on payer coverage, community resources, and patient preferences, and barriers to discharge  - Address psychosocial, clinical, and financial barriers to discharge as identified in assessment in conjunction with the patient/family and health care team  - Arrange appropriate level of post-acute services according to patient's   needs and preference and payer coverage in collaboration with the physician and health care team  - Communicate with and update the patient/family, physician, and health care team regarding progress on the discharge plan  - Arrange appropriate transportation to post-acute venues  - Patient will require short term rehab in an acute rehab vs  Skilled Nursing Facility   Outcome: Progressing

## 2018-07-17 NOTE — PROGRESS NOTES
Progress Note - [facility] 1935, 82 y o  male MRN: [id number]    Unit/Bed#: -01 Encounter: [id number]    Primary Care Provider: Mei Haro MD   Date and time admitted to hospital: 7/16/2018  7:28 PM        * Closed displaced intertrochanteric fracture of right femur [facility])   Assessment & Plan    · Orthopedics input is appreciated  The patient is scheduled for hip repair on 07/18/2018  · Continue with Pain management - Tylenol for mild pain, Norco for moderate pain, IV morphine for severe pain          Fall   Assessment & Plan    · Patient is status post mechanical fall prior to admission  · He has a resultant hip fracture as outlined above  · CT scan of the head was within normal limits  · No further workup        Pre-op evaluation   Assessment & Plan    · From a preop risk stratification standpoint patient is at moderate to high risk for a diana operative cardiac event  · Risk factors include, history of previous CVAs, end-stage Alzheimer's dementia, poor baseline ambulatory dysfunction as well as other comorbid medical conditions  · The case was discussed with his wife at admission by admitting physician  · She understands the risks but would like to proceed with the surgery and goes on to state that the patient is a DNR/ DNI          MIRIAM (acute kidney injury) ([facility] [facility] )   Assessment & Plan    · Likely secondary to volume depletion  · Will check renal ultrasound  · Will check renal function in the a m  Lorenza Mort If this does not improve will consider consulting Nephrology  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 behavioral disturbance   Assessment & Plan    · Patient at baseline is alert, awake, and  oriented x 0  · Wife also reports he is nonverbal  · Continue supportive care         Hyponatremia   Assessment & Plan    · Suspected this to be secondary to volume depletion  I will increase rate for IV fluid and will follow up with sodium level in the a m               Leukocytosis Assessment & Plan    · Most likely reactive  · Will monitor with daily CBCs        Dyslipidemia   Assessment & Plan    · Continue Zetia        Acquired hypothyroidism   Assessment & Plan    · Continue Synthroid        Closed right hip fracture [facility])   Assessment & Plan    · Patient has an acute intertrochanteric hip fracture of the proximal femur  · Admitted to med surg  · Consult Orthopedics  · Pain management - Tylenol for mild pain, Norco for moderate pain, IV morphine for severe pain  · Case was discussed with Dr Shweta Holcomb -tentatively scheduled for a hip repair on 2018           VTE Pharmacologic Prophylaxis:   Pharmacologic: Heparin  Mechanical VTE Prophylaxis in Place: Yes    Patient Centered Rounds: I have performed bedside rounds with nursing staff today  Discussions with Specialists or Other Care Team Provider:  Nursing and ortho     Education and Discussions with Family / Patient:  Wife     Time Spent for Care: 20 minutes  More than 50% of total time spent on counseling and coordination of care as described above  Current Length of Stay: 1 day(s)    Current Patient Status: Inpatient   Certification Statement: The patient will continue to require additional inpatient hospital stay due to hip surgery     Discharge Plan:  Pending hospital course    Code Status: Level 3 - DNAR and DNI      Subjective:    non-verbal  Wife at bedside  States that this is his baseline  Objective:     Vitals:   Temp (24hrs), Av 6 °F (35 9 °C), Min:95 °F (35 °C), Max:98 9 °F (37 2 °C)    HR:  [64-88] 88  Resp:  [18-20] 18  BP: ()/(57-84) 122/84  SpO2:  [82 %-96 %] 82 %  Body mass index is 21 49 kg/m²  Input and Output Summary (last 24 hours):        Intake/Output Summary (Last 24 hours) at 18 1533  Last data filed at 18 2031   Gross per 24 hour   Intake             1000 ml   Output                0 ml   Net             1000 ml       Physical Exam:     Physical Exam Constitutional: He appears well-developed  No distress  Cachetic      HENT:   Head: Normocephalic and atraumatic  Mouth/Throat: Oropharynx is clear and moist    Eyes: Conjunctivae and EOM are normal  Pupils are equal, round, and reactive to light  Neck: Normal range of motion  Neck supple  No thyromegaly present  Cardiovascular: Normal rate, regular rhythm, normal heart sounds and intact distal pulses  Pulmonary/Chest: Effort normal  No respiratory distress  He has no wheezes  Decreased in bases     Abdominal: Soft  Bowel sounds are normal  He exhibits no distension  There is no tenderness  Musculoskeletal: Normal range of motion  He exhibits deformity  He exhibits no edema  Right hip: He exhibits tenderness, bony tenderness and deformity  Crepitus: externally rotated  RLE is shorten and externally rotated  Neurological: He is alert  He has normal reflexes  Skin: Skin is warm and dry  No erythema  Psychiatric:   Unable to assess     Vitals reviewed  Additional Data:     Labs:      Results from last 7 days  Lab Units 07/17/18  0624   WBC Thousand/uL 12 10*   HEMOGLOBIN g/dL 11 6*   HEMATOCRIT % 34 6*   PLATELETS Thousands/uL 229   NEUTROS PCT % 84*   LYMPHS PCT % 6*   MONOS PCT % 10   EOS PCT % 0       Results from last 7 days  Lab Units 07/17/18  0624 07/16/18  1458   SODIUM mmol/L 131* 130*   POTASSIUM mmol/L 5 2 4 6   CHLORIDE mmol/L 98 95*   CO2 mmol/L 24 28   BUN mg/dL 26* 20   CREATININE mg/dL 1 58* 1 14   CALCIUM mg/dL 9 4 9 5   TOTAL PROTEIN g/dL  --  6 6   BILIRUBIN TOTAL mg/dL  --  0 50   ALK PHOS U/L  --  122   ALT U/L  --  18   AST U/L  --  20   GLUCOSE RANDOM mg/dL 142* 176*       Results from last 7 days  Lab Units 07/17/18  0624   INR  1 05                 * I Have Reviewed All Lab Data Listed Above  * Additional Pertinent Lab Tests Reviewed:  All Labs For Current Hospital Admission Reviewed    Imaging:    Imaging Reports Reviewed Today Include: xray of right hip, CT head, CT abdomen    Recent Cultures (last 7 days):           Last 24 Hours Medication List:     Current Facility-Administered Medications:  acetaminophen 650 mg Oral Q6H PRN Lindsay Acosta MD    bisacodyl 10 mg Rectal Q24H Lindsay Acosta MD    [START ON 7/18/2018] cefazolin 1,000 mg Intravenous Once Diadnra Somers DO    docusate sodium 100 mg Oral BID Lindsay Acosta MD    ezetimibe 10 mg Oral Daily Lindsay Acosta MD    HYDROcodone-acetaminophen 1 tablet Oral Q6H PRN Lindsay Acosta MD    levothyroxine 88 mcg Oral Early Morning Lindsay Acosta MD    morphine injection 1 mg Intravenous Q3H PRN MESSI Hanks    multivitamin-minerals 1 tablet Oral Daily Lindsay Acosta MD    ondansetron 4 mg Intravenous Q6H PRN Lindsay Acosta MD    sodium chloride 100 mL/hr Intravenous Continuous MESSI Hanks Last Rate: 75 mL/hr (07/17/18 1010)        Today, Patient Was Seen By: MESSI Hanks    ** Please Note: Dictation voice to text software may have been used in the creation of this document   **

## 2018-07-17 NOTE — ASSESSMENT & PLAN NOTE
· Likely secondary to volume depletion  · Will check renal ultrasound  · Will check renal function in the a m  Gallego Spruce If this does not improve will consider consulting Nephrology

## 2018-07-18 ENCOUNTER — ANESTHESIA EVENT (INPATIENT)
Dept: PERIOP | Facility: HOSPITAL | Age: 83
DRG: 481 | End: 2018-07-18
Payer: MEDICARE

## 2018-07-18 ENCOUNTER — ANESTHESIA (INPATIENT)
Dept: PERIOP | Facility: HOSPITAL | Age: 83
DRG: 481 | End: 2018-07-18
Payer: MEDICARE

## 2018-07-18 ENCOUNTER — APPOINTMENT (INPATIENT)
Dept: RADIOLOGY | Facility: HOSPITAL | Age: 83
DRG: 481 | End: 2018-07-18
Payer: MEDICARE

## 2018-07-18 LAB
ABO GROUP BLD: NORMAL
ANION GAP SERPL CALCULATED.3IONS-SCNC: 8 MMOL/L (ref 4–13)
BACTERIA UR CULT: NORMAL
BLD GP AB SCN SERPL QL: NEGATIVE
BUN SERPL-MCNC: 32 MG/DL (ref 7–25)
CALCIUM SERPL-MCNC: 8.8 MG/DL (ref 8.6–10.5)
CHLORIDE SERPL-SCNC: 104 MMOL/L (ref 98–107)
CO2 SERPL-SCNC: 23 MMOL/L (ref 21–31)
CREAT SERPL-MCNC: 1.66 MG/DL (ref 0.7–1.3)
ERYTHROCYTE [DISTWIDTH] IN BLOOD BY AUTOMATED COUNT: 14.6 % (ref 11.5–14.5)
GFR SERPL CREATININE-BSD FRML MDRD: 38 ML/MIN/1.73SQ M
GLUCOSE SERPL-MCNC: 116 MG/DL (ref 65–99)
HCT VFR BLD AUTO: 25.8 % (ref 36.5–49.3)
HGB BLD-MCNC: 9 G/DL (ref 14–18)
MCH RBC QN AUTO: 31.9 PG (ref 26–34)
MCHC RBC AUTO-ENTMCNC: 34.8 G/DL (ref 31–37)
MCV RBC AUTO: 92 FL (ref 81–99)
PLATELET # BLD AUTO: 180 THOUSANDS/UL (ref 149–390)
PMV BLD AUTO: 7.5 FL (ref 8.6–11.7)
POTASSIUM SERPL-SCNC: 4.8 MMOL/L (ref 3.5–5.5)
RBC # BLD AUTO: 2.81 MILLION/UL (ref 4.3–5.9)
RH BLD: POSITIVE
SODIUM SERPL-SCNC: 135 MMOL/L (ref 134–143)
SPECIMEN EXPIRATION DATE: NORMAL
WBC # BLD AUTO: 15.2 THOUSAND/UL (ref 4.8–10.8)

## 2018-07-18 PROCEDURE — 86900 BLOOD TYPING SEROLOGIC ABO: CPT | Performed by: ORTHOPAEDIC SURGERY

## 2018-07-18 PROCEDURE — 86901 BLOOD TYPING SEROLOGIC RH(D): CPT | Performed by: ORTHOPAEDIC SURGERY

## 2018-07-18 PROCEDURE — C1769 GUIDE WIRE: HCPCS | Performed by: ORTHOPAEDIC SURGERY

## 2018-07-18 PROCEDURE — C1713 ANCHOR/SCREW BN/BN,TIS/BN: HCPCS | Performed by: ORTHOPAEDIC SURGERY

## 2018-07-18 PROCEDURE — 86850 RBC ANTIBODY SCREEN: CPT | Performed by: ORTHOPAEDIC SURGERY

## 2018-07-18 PROCEDURE — 73502 X-RAY EXAM HIP UNI 2-3 VIEWS: CPT

## 2018-07-18 PROCEDURE — 80048 BASIC METABOLIC PNL TOTAL CA: CPT | Performed by: NURSE PRACTITIONER

## 2018-07-18 PROCEDURE — 99232 SBSQ HOSP IP/OBS MODERATE 35: CPT | Performed by: NURSE PRACTITIONER

## 2018-07-18 PROCEDURE — 85027 COMPLETE CBC AUTOMATED: CPT | Performed by: NURSE PRACTITIONER

## 2018-07-18 PROCEDURE — 94760 N-INVAS EAR/PLS OXIMETRY 1: CPT

## 2018-07-18 PROCEDURE — 0QSB06Z REPOSITION RIGHT LOWER FEMUR WITH INTRAMEDULLARY INTERNAL FIXATION DEVICE, OPEN APPROACH: ICD-10-PCS | Performed by: ORTHOPAEDIC SURGERY

## 2018-07-18 DEVICE — 5.0MM TI LOCKING SCREW 38MM- FOR IM NAILS: Type: IMPLANTABLE DEVICE | Site: TROCHANTER | Status: FUNCTIONAL

## 2018-07-18 DEVICE — 11MM/130 DEG TI CANN TROCH FIXATION NAIL 170MM-STERILE: Type: IMPLANTABLE DEVICE | Site: TROCHANTER | Status: FUNCTIONAL

## 2018-07-18 RX ORDER — EPHEDRINE SULFATE 50 MG/ML
INJECTION, SOLUTION INTRAVENOUS AS NEEDED
Status: DISCONTINUED | OUTPATIENT
Start: 2018-07-18 | End: 2018-07-18 | Stop reason: SURG

## 2018-07-18 RX ORDER — MORPHINE SULFATE 4 MG/ML
3 INJECTION, SOLUTION INTRAMUSCULAR; INTRAVENOUS EVERY 4 HOURS PRN
Status: DISCONTINUED | OUTPATIENT
Start: 2018-07-18 | End: 2018-07-23 | Stop reason: HOSPADM

## 2018-07-18 RX ORDER — CEFAZOLIN SODIUM 1 G/3ML
INJECTION, POWDER, FOR SOLUTION INTRAMUSCULAR; INTRAVENOUS AS NEEDED
Status: DISCONTINUED | OUTPATIENT
Start: 2018-07-18 | End: 2018-07-18 | Stop reason: SURG

## 2018-07-18 RX ORDER — ESMOLOL HYDROCHLORIDE 10 MG/ML
INJECTION INTRAVENOUS AS NEEDED
Status: DISCONTINUED | OUTPATIENT
Start: 2018-07-18 | End: 2018-07-18 | Stop reason: SURG

## 2018-07-18 RX ORDER — FONDAPARINUX SODIUM 2.5 MG/.5ML
2.5 INJECTION SUBCUTANEOUS DAILY
Status: DISCONTINUED | OUTPATIENT
Start: 2018-07-19 | End: 2018-07-23 | Stop reason: HOSPADM

## 2018-07-18 RX ORDER — SODIUM CHLORIDE, SODIUM LACTATE, POTASSIUM CHLORIDE, CALCIUM CHLORIDE 600; 310; 30; 20 MG/100ML; MG/100ML; MG/100ML; MG/100ML
75 INJECTION, SOLUTION INTRAVENOUS CONTINUOUS
Status: DISCONTINUED | OUTPATIENT
Start: 2018-07-18 | End: 2018-07-19

## 2018-07-18 RX ORDER — ONDANSETRON 2 MG/ML
INJECTION INTRAMUSCULAR; INTRAVENOUS AS NEEDED
Status: DISCONTINUED | OUTPATIENT
Start: 2018-07-18 | End: 2018-07-18 | Stop reason: SURG

## 2018-07-18 RX ORDER — DEXTROSE AND SODIUM CHLORIDE 5; .45 G/100ML; G/100ML
100 INJECTION, SOLUTION INTRAVENOUS CONTINUOUS
Status: DISCONTINUED | OUTPATIENT
Start: 2018-07-18 | End: 2018-07-21

## 2018-07-18 RX ORDER — FENTANYL CITRATE/PF 50 MCG/ML
12.5 SYRINGE (ML) INJECTION
Status: DISCONTINUED | OUTPATIENT
Start: 2018-07-18 | End: 2018-07-18 | Stop reason: HOSPADM

## 2018-07-18 RX ORDER — BUPIVACAINE HYDROCHLORIDE 5 MG/ML
INJECTION, SOLUTION PERINEURAL AS NEEDED
Status: DISCONTINUED | OUTPATIENT
Start: 2018-07-18 | End: 2018-07-18 | Stop reason: HOSPADM

## 2018-07-18 RX ORDER — POVIDONE-IODINE 10 MG/G
OINTMENT TOPICAL AS NEEDED
Status: DISCONTINUED | OUTPATIENT
Start: 2018-07-18 | End: 2018-07-18 | Stop reason: HOSPADM

## 2018-07-18 RX ORDER — SODIUM CHLORIDE, SODIUM LACTATE, POTASSIUM CHLORIDE, CALCIUM CHLORIDE 600; 310; 30; 20 MG/100ML; MG/100ML; MG/100ML; MG/100ML
INJECTION, SOLUTION INTRAVENOUS CONTINUOUS PRN
Status: DISCONTINUED | OUTPATIENT
Start: 2018-07-18 | End: 2018-07-18 | Stop reason: SURG

## 2018-07-18 RX ORDER — FENTANYL CITRATE 50 UG/ML
INJECTION, SOLUTION INTRAMUSCULAR; INTRAVENOUS AS NEEDED
Status: DISCONTINUED | OUTPATIENT
Start: 2018-07-18 | End: 2018-07-18 | Stop reason: SURG

## 2018-07-18 RX ORDER — PROPOFOL 10 MG/ML
INJECTION, EMULSION INTRAVENOUS AS NEEDED
Status: DISCONTINUED | OUTPATIENT
Start: 2018-07-18 | End: 2018-07-18 | Stop reason: SURG

## 2018-07-18 RX ORDER — PROPOFOL 10 MG/ML
INJECTION, EMULSION INTRAVENOUS CONTINUOUS PRN
Status: DISCONTINUED | OUTPATIENT
Start: 2018-07-18 | End: 2018-07-18 | Stop reason: SURG

## 2018-07-18 RX ORDER — GLYCOPYRROLATE 0.2 MG/ML
INJECTION INTRAMUSCULAR; INTRAVENOUS AS NEEDED
Status: DISCONTINUED | OUTPATIENT
Start: 2018-07-18 | End: 2018-07-18 | Stop reason: SURG

## 2018-07-18 RX ORDER — ALBUMIN, HUMAN INJ 5% 5 %
25 SOLUTION INTRAVENOUS ONCE
Status: COMPLETED | OUTPATIENT
Start: 2018-07-18 | End: 2018-07-18

## 2018-07-18 RX ORDER — BUPIVACAINE HYDROCHLORIDE 7.5 MG/ML
INJECTION, SOLUTION INTRASPINAL AS NEEDED
Status: DISCONTINUED | OUTPATIENT
Start: 2018-07-18 | End: 2018-07-18 | Stop reason: SURG

## 2018-07-18 RX ADMIN — ALBUMIN (HUMAN) 25 G: 12.5 INJECTION, SOLUTION INTRAVENOUS at 18:07

## 2018-07-18 RX ADMIN — PROPOFOL 10 MG: 10 INJECTION, EMULSION INTRAVENOUS at 16:05

## 2018-07-18 RX ADMIN — ESMOLOL HYDROCHLORIDE 10 MG: 10 INJECTION, SOLUTION INTRAVENOUS at 16:19

## 2018-07-18 RX ADMIN — CEFAZOLIN SODIUM 1000 MG: 1 SOLUTION INTRAVENOUS at 21:13

## 2018-07-18 RX ADMIN — PROPOFOL 10 MCG/KG/MIN: 10 INJECTION, EMULSION INTRAVENOUS at 16:10

## 2018-07-18 RX ADMIN — BISACODYL 10 MG: 10 SUPPOSITORY RECTAL at 21:22

## 2018-07-18 RX ADMIN — DEXTROSE AND SODIUM CHLORIDE 75 ML/HR: 5; 450 INJECTION, SOLUTION INTRAVENOUS at 19:13

## 2018-07-18 RX ADMIN — EPHEDRINE SULFATE 5 MG: 50 INJECTION INTRAMUSCULAR; INTRAVENOUS; SUBCUTANEOUS at 16:10

## 2018-07-18 RX ADMIN — FENTANYL CITRATE 25 MCG: 50 INJECTION INTRAMUSCULAR; INTRAVENOUS at 16:00

## 2018-07-18 RX ADMIN — ONDANSETRON HYDROCHLORIDE 4 MG: 2 INJECTION, SOLUTION INTRAVENOUS at 16:55

## 2018-07-18 RX ADMIN — GLYCOPYRROLATE 0.2 MG: 0.2 INJECTION, SOLUTION INTRAMUSCULAR; INTRAVENOUS at 16:14

## 2018-07-18 RX ADMIN — CEFAZOLIN 1000 MG: 330 INJECTION, POWDER, FOR SOLUTION INTRAMUSCULAR; INTRAVENOUS at 16:31

## 2018-07-18 RX ADMIN — BUPIVACAINE HYDROCHLORIDE IN DEXTROSE 1.4 ML: 7.5 INJECTION, SOLUTION SUBARACHNOID at 16:09

## 2018-07-18 RX ADMIN — SODIUM CHLORIDE, SODIUM LACTATE, POTASSIUM CHLORIDE, AND CALCIUM CHLORIDE: .6; .31; .03; .02 INJECTION, SOLUTION INTRAVENOUS at 15:51

## 2018-07-18 RX ADMIN — SODIUM CHLORIDE 125 ML/HR: 9 INJECTION, SOLUTION INTRAVENOUS at 06:39

## 2018-07-18 RX ADMIN — PROPOFOL 10 MG: 10 INJECTION, EMULSION INTRAVENOUS at 16:00

## 2018-07-18 RX ADMIN — CEFAZOLIN SODIUM 1000 MG: 1 SOLUTION INTRAVENOUS at 14:23

## 2018-07-18 RX ADMIN — FENTANYL CITRATE 25 MCG: 50 INJECTION INTRAMUSCULAR; INTRAVENOUS at 15:56

## 2018-07-18 RX ADMIN — PROPOFOL 10 MG: 10 INJECTION, EMULSION INTRAVENOUS at 15:55

## 2018-07-18 NOTE — PROGRESS NOTES
Called Dr Ortez Crew to make him aware that pt's wife signed the consent for surgery but left to go home  He stated that he will try to call her at home

## 2018-07-18 NOTE — PLAN OF CARE
Problem: Potential for Falls  Goal: Patient will remain free of falls  INTERVENTIONS:  - Assess patient frequently for physical needs  -  Identify cognitive and physical deficits and behaviors that affect risk of falls  -  [place] fall precautions as indicated by assessment   - Educate patient/family on patient safety including physical limitations  - Instruct patient to call for assistance with activity based on assessment  - Modify environment to reduce risk of injury  - Consider OT/PT consult to assist with strengthening/mobility   Outcome: Progressing  Patient will not sustain fall  Patient and caregiver will implement strategies to increase safety and prevent falls

## 2018-07-18 NOTE — ASSESSMENT & PLAN NOTE
· Likely secondary to volume depletion  · Renal function is slightly worse today  US renal does not show any hydronephrosis on right kidney  Left kidney is not visualized  · Will consult renal for evaluation

## 2018-07-18 NOTE — ASSESSMENT & PLAN NOTE
· Suspected this to be secondary to volume depletion  · Sodium this AM is 135  Will continue with IVF     · Follow up with Na in AM

## 2018-07-18 NOTE — SOCIAL WORK
Patient discussed in discharge planning meeting  Patient to OR today for surgery  ARU still reviewing for STR when medically stable  If ARU declines pt accepted to [place]  Referral also sent to [facility] at family request awaiting decision from same  CM to follow

## 2018-07-18 NOTE — ASSESSMENT & PLAN NOTE
· Orthopedics input is appreciated  The patient is scheduled for hip surgery today    · Continue with Pain management - Tylenol for mild pain, Norco for moderate pain, IV morphine for severe pain

## 2018-07-18 NOTE — ANESTHESIA POSTPROCEDURE EVALUATION
Post-Op Assessment Note      CV Status:  Stable    Mental Status:  Awake (eyes open spontaneously, appears to be at preoperative baseline)    Hydration Status:  Hypovolemic (albumin ordered for administration in PACU)    PONV Controlled:  None    Airway Patency:  Patent    Post Op Vitals Reviewed: Yes          Staff: Anesthesiologist           BP (P) 97/56 (07/18/18 1737)    Temp (!) (P) 93 4 °F (34 1 °C) (07/18/18 1737)    Pulse (P) 90 (07/18/18 1737)   Resp      SpO2 (P) 100 % (07/18/18 1737)

## 2018-07-18 NOTE — ASSESSMENT & PLAN NOTE
· Patient is status post mechanical fall prior to admission  · He has a resultant hip fracture as outlined above  · CT scan of the head was within normal limits  · No further workup  · Fall precaution  · PT/OT post op

## 2018-07-18 NOTE — PROGRESS NOTES
Progress Note - Elizabeth Ferrari 1935, 82 y o  male MRN: [id number]    Unit/Bed#: -01 Encounter: [id number]    Primary Care Provider: Chino Bhat MD   Date and time admitted to hospital: 7/16/2018  7:28 PM        * Closed displaced intertrochanteric fracture of right femur [facility])   Assessment & Plan    · Orthopedics input is appreciated  The patient is scheduled for hip surgery today  · Continue with Pain management - Tylenol for mild pain, Norco for moderate pain, IV morphine for severe pain          Fall   Assessment & Plan    · Patient is status post mechanical fall prior to admission  · He has a resultant hip fracture as outlined above  · CT scan of the head was within normal limits  · No further workup  · Fall precaution  · PT/OT post op        Pre-op evaluation   Assessment & Plan    · From a preop risk stratification standpoint patient is at moderate to high risk for a diana operative cardiac event  · Risk factors include, history of previous CVAs, end-stage Alzheimer's dementia, poor baseline ambulatory dysfunction as well as other comorbid medical conditions  · The case was discussed with his wife at admission by admitting physician  · She understands the risks but would like to proceed with the surgery and goes on to state that the patient is a DNR/ DNI          MIRIAM (acute kidney injury) ([facility] [address] )   Assessment & Plan    · Likely secondary to volume depletion  · Renal function is slightly worse today  US renal does not show any hydronephrosis on right kidney  Left kidney is not visualized  · Will consult renal for evaluation  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 behavioral disturbance   Assessment & Plan    · Patient at baseline is alert, awake, and  oriented x 0  · Wife also reports he is nonverbal  · Continue supportive care         Hyponatremia   Assessment & Plan    · Suspected this to be secondary to volume depletion  · Sodium this AM is 135  Will continue with IVF  · Follow up with Na in AM             Leukocytosis   Assessment & Plan    · Most likely reactive  · Will monitor with daily CBCs  · Afebrile, UA is clean  Dyslipidemia   Assessment & Plan    · Continue Zetia        Acquired hypothyroidism   Assessment & Plan    · Continue Synthroid            VTE Pharmacologic Prophylaxis:   Pharmacologic: Pharmacologic VTE Prophylaxis contraindicated due to surgery   Mechanical VTE Prophylaxis in Place: Yes    Patient Centered Rounds: I have performed bedside rounds with nursing staff today  Discussions with Specialists or Other Care Team Provider:  Nursing     Education and Discussions with Family / Patient:  Wife     Time Spent for Care: 20 minutes  More than 50% of total time spent on counseling and coordination of care as described above  Current Length of Stay: 2 day(s)    Current Patient Status: Inpatient   Certification Statement: The patient will continue to require additional inpatient hospital stay due to pending hip surgery     Discharge Plan:  Pending hospital course     Code Status: Level 3 - DNAR and DNI      Subjective:    non-verbal  Sleeping in bed comfortably  Objective:     Vitals:   Temp (24hrs), Av 2 °F (36 2 °C), Min:96 6 °F (35 9 °C), Max:97 6 °F (36 4 °C)    HR:  [] 89  Resp:  [18-20] 18  BP: ()/(62-84) 90/62  SpO2:  [82 %-92 %] 90 %  Body mass index is 21 49 kg/m²  Input and Output Summary (last 24 hours): Intake/Output Summary (Last 24 hours) at 18 1504  Last data filed at 18 0601   Gross per 24 hour   Intake               60 ml   Output              450 ml   Net             -390 ml       Physical Exam:     Physical Exam   Constitutional: He appears well-developed  No distress  Cachetic appearing      HENT:   Head: Normocephalic and atraumatic  Mucosa slightly dry      Eyes: Conjunctivae and EOM are normal  Pupils are equal, round, and reactive to light  Neck: Normal range of motion   Neck supple  No thyromegaly present  Cardiovascular: Normal rate, regular rhythm, normal heart sounds and intact distal pulses  Pulmonary/Chest: Effort normal  No respiratory distress  Decreased in bases     Abdominal: Bowel sounds are normal  He exhibits no distension  There is no tenderness  Musculoskeletal: He exhibits edema (right lower extremities)  Right hip: He exhibits tenderness, bony tenderness, swelling and deformity (externally rotated and shortened)  Neurological: He is alert  He has normal reflexes  Skin: Skin is warm and dry  No erythema  Psychiatric:   Unable to assess     Vitals reviewed  Additional Data:     Labs:      Results from last 7 days  Lab Units 07/18/18  0519 07/17/18  0624   WBC Thousand/uL 15 20* 12 10*   HEMOGLOBIN g/dL 9 0* 11 6*   HEMATOCRIT % 25 8* 34 6*   PLATELETS Thousands/uL 180 229   NEUTROS PCT %  --  84*   LYMPHS PCT %  --  6*   MONOS PCT %  --  10   EOS PCT %  --  0       Results from last 7 days  Lab Units 07/18/18  0519  07/16/18  1458   SODIUM mmol/L 135  < > 130*   POTASSIUM mmol/L 4 8  < > 4 6   CHLORIDE mmol/L 104  < > 95*   CO2 mmol/L 23  < > 28   BUN mg/dL 32*  < > 20   CREATININE mg/dL 1 66*  < > 1 14   CALCIUM mg/dL 8 8  < > 9 5   TOTAL PROTEIN g/dL  --   --  6 6   BILIRUBIN TOTAL mg/dL  --   --  0 50   ALK PHOS U/L  --   --  122   ALT U/L  --   --  18   AST U/L  --   --  20   GLUCOSE RANDOM mg/dL 116*  < > 176*   < > = values in this interval not displayed  Results from last 7 days  Lab Units 07/17/18  0624   INR  1 05                 * I Have Reviewed All Lab Data Listed Above  * Additional Pertinent Lab Tests Reviewed:  Johnny 68 Admission Reviewed        Recent Cultures (last 7 days):       Results from last 7 days  Lab Units 07/17/18  1453   URINE CULTURE  No Growth <1000 cfu/mL       Last 24 Hours Medication List:     Current Facility-Administered Medications:  acetaminophen 650 mg Oral Q6H PRN Barbara Co Andrez May MD    bisacodyl 10 mg Rectal Q24H Danika Beckford MD    docusate sodium 100 mg Oral BID Danika Beckford MD    ezetimibe 10 mg Oral Daily Danika Beckford MD    HYDROcodone-acetaminophen 1 tablet Oral Q6H PRN Danika Beckford MD    levothyroxine 88 mcg Oral Early Morning Danika Beckford MD    morphine injection 1 mg Intravenous Q3H PRN MESSI Piña    multivitamin-minerals 1 tablet Oral Daily Danika Beckford MD    ondansetron 4 mg Intravenous Q6H PRN Danika Beckford MD    sodium chloride 75 mL/hr Intravenous Continuous MESSI Piña Last Rate: 125 mL/hr (07/18/18 7619)        Today, Patient Was Seen By: MESSI Piña    ** Please Note: Dictation voice to text software may have been used in the creation of this document   **

## 2018-07-18 NOTE — ANESTHESIA PROCEDURE NOTES
Spinal Block    Patient location during procedure: OR  Start time: 7/18/2018 4:01 PM  End time: 7/18/2018 4:09 PM  Reason for block: primary anesthetic  Staffing  Anesthesiologist: Allen Rojas  Other anesthesia staff: Hugh Tristan  Performed: anesthesiologist   Preanesthetic Checklist  Completed: patient identified, surgical consent, pre-op evaluation, timeout performed, IV checked, risks and benefits discussed and monitors and equipment checked  Spinal Block  Patient position: right lateral decubitus  Prep: Betadine  Patient monitoring: heart rate, cardiac monitor, continuous pulse ox and frequent blood pressure checks  Approach: midline  Location: L3-4  Injection technique: single-shot  Needle  Needle type: pencil-tip   Needle gauge: 25 G  Needle length: 10 cm  Assessment  Injection Assessment:  negative aspiration for heme, positive aspiration for clear CSF and no paresthesia on injection    Post-procedure:  site cleaned  Additional Notes  x2 site attempts

## 2018-07-18 NOTE — ANESTHESIA PREPROCEDURE EVALUATION
Review of Systems/Medical History  Patient summary reviewed  Chart reviewed  No history of anesthetic complications     Cardiovascular  EKG reviewed, Exercise tolerance (METS): <4,  Hyperlipidemia,    Pulmonary    Comment: 2L nc added while inpatient for desaturation     GI/Hepatic      Comment: NPO appropriate per documentation       Comment: MIRIAM, possible CKD (Cr 1 66 today)     Endo/Other  History of thyroid disease ,      GYN       Hematology  Anemia ,     Musculoskeletal    Comment: Right hip fracture      Neurology    CVA , Aphasia/Dysphagia,    Psychology     Dementia  Comment: Severe dementia, patient nonverbal at baseline       Lab Results   Component Value Date    WBC 15 20 (H) 07/18/2018    HGB 9 0 (L) 07/18/2018    HCT 25 8 (L) 07/18/2018    MCV 92 07/18/2018     07/18/2018     Lab Results   Component Value Date    GLUCOSE 116 (H) 07/18/2018    CALCIUM 8 8 07/18/2018     07/18/2018    K 4 8 07/18/2018    CO2 23 07/18/2018     07/18/2018    BUN 32 (H) 07/18/2018    CREATININE 1 66 (H) 07/18/2018     Lab Results   Component Value Date    INR 1 05 07/17/2018    PROTIME 12 2 07/17/2018     No results found for: PTT    Physical Exam    Airway    Mallampati score: III  TM Distance: >3 FB  Neck ROM: full     Dental   lower dentures and upper dentures,     Cardiovascular  Rhythm: regular, Rate: normal, Cardiovascular exam normal    Pulmonary  Pulmonary exam normal     Other Findings        Anesthesia Plan  ASA Score- 4     Anesthesia Type- general and spinal with ASA Monitors  Additional Monitors:   Airway Plan: LMA  Comment: Spinal and GA explained to wife and accepted  Questions answered  Agrees to spinal with GA backup        Plan Factors-    Induction- intravenous  Postoperative Plan- Plan for postoperative opioid use  Informed Consent- Anesthetic plan and risks discussed with spouse  I personally reviewed this patient with the CRNA   Discussed and agreed on the Anesthesia Plan with the CRNA             Lab Results   Component Value Date    WBC 15 20 (H) 07/18/2018    HGB 9 0 (L) 07/18/2018    HCT 25 8 (L) 07/18/2018    MCV 92 07/18/2018     07/18/2018     Lab Results   Component Value Date    GLUCOSE 116 (H) 07/18/2018    CALCIUM 8 8 07/18/2018     07/18/2018    K 4 8 07/18/2018    CO2 23 07/18/2018     07/18/2018    BUN 32 (H) 07/18/2018    CREATININE 1 66 (H) 07/18/2018     Lab Results   Component Value Date    ALT 18 07/16/2018    AST 20 07/16/2018    ALKPHOS 122 07/16/2018    BILITOT 0 50 07/16/2018     Lab Results   Component Value Date    INR 1 05 07/17/2018    PROTIME 12 2 07/17/2018     No results found for: HGBA1C

## 2018-07-18 NOTE — OP NOTE
OPERATIVE REPORT  PATIENT NAME: Pawel Escalante    :  1935  MRN: [id number]  Pt Location: [facility] OR ROOM 03    SURGERY DATE: 2018    Surgeon(s) and Role:     * Lisa Monsonr, DO - Primary         Preop Diagnosis:  Closed displaced intertrochanteric fracture of right femur, initial encounter ([place] [address] ) Chente Funes    Post-Op Diagnosis Codes:     * Closed displaced intertrochanteric fracture of right femur, initial encounter ([place] [address] ) [S72 141A]    Procedure(s) (LRB):  INSERTION NAIL IM FEMUR ANTEGRADE (TROCHANTERIC) (Right) using the Synthes TFN nail system with an 11 mm x 130 degree short TFN nail, a 100 mm spiral blade, a 38 mm distal locking screw    Specimen(s):  none    Estimated Blood Loss:   100    Drains: none       Anesthesia Type:   Spinal    Operative Indications:  Closed displaced intertrochanteric fracture of right femur, initial encounter ([place] [address] ) [S72 141A]      Operative Findings:  Displaced IT fracture right hip    Complications:   None    Procedure and Technique:    The patient was properly identified and brought into the operative suite  After successful  induction of the anesthetic he was transferred to the fracture table  A closed reduction  occured with a combination of longitudinal traction and rotation  Fluoroscopic films showed anatomic reduction of the fracture  At this point the right lower extremity was then prepped and draped in the usual usual sterile fashion  The surgical landmarks were outlined with skin marker  Using a 15  Scalpel blade a longitudinal incision was made on patient's right hip starting just above the level of the greater trochanter  Hemostasis was achieved the use of electrocautery  This layer was brought down to the subcutaneous fat layer  Further blunt dissection the gluteal tendon was then located  It was divided with a 2nd 15  Scalpel blade  The muscle was then bluntly split  The tip of the greater trochanter was now palpable    Using the Synthes TFN nail system a guide pin was placed through the tip of the greater trochanter and down the femoral canal and confirmed to be in adequate position using multiple fluoroscopic views  The femoral canal Reamer was then used to open up the canal   In the meantime  An 11 mm x 1 30 degree short TFN nail was assembled and this is passed over the starting hole without any incidence  It was advanced distally so that the eyelet for the spiral blade would be in the head and neck and central/central position  Once this confirmed it was just advanced just short of subchondral bone  It was then measured  Then using the lateral cortex Reamer as well as the spiral blade Reamer over the guide pin with a without any incidence  A 100 mm spiral blade was then placed  It was then locked proximally with the internal set screw  The fracture is then compressed  The guide pin was then removed  Attention then paid to the distal locking hole     Using the distal locking drill this was placed under fluoroscopic guidance and measured to be 36 mm  The screw was then placed  Fluoroscopy was then placed back in the operative field which showed anatomic reduction of fracture without any incarceration the hardware into the joint  All the wounds were irrigating using sterile antibiotic solution  The gluteal tendon and iliotibial band were closed with 0 Vicryl, deep fascia closed with 0 Vicryl  Superficial layer with  2 Vicryl  Skin  Approximated with  staples  The wounds were then dressed with ointment Xeroform 4x4s and Tegaderm  There was  no complications during the procedure  He was successfully woken, transferred to the hospital bed, and went to recovery in stable condition                 I was present for the entire procedure    Patient Disposition:  PACU     SIGNATURE: Teresa Castellon DO  DATE: July 18, 2018  TIME: 3:03 PM

## 2018-07-18 NOTE — PLAN OF CARE
DISCHARGE PLANNING     Discharge to home or other facility with appropriate resources Progressing        DISCHARGE PLANNING - CARE MANAGEMENT     Discharge to post-acute care or home with appropriate resources Progressing        INFECTION - ADULT     Absence of fever/infection during neutropenic period Progressing        Knowledge Deficit     Patient/family/caregiver demonstrates understanding of disease process, treatment plan, medications, and discharge instructions Progressing        Nutrition/Hydration-ADULT     Nutrient/Hydration intake appropriate for improving, restoring or maintaining nutritional needs Progressing        PAIN - ADULT     Verbalizes/displays adequate comfort level or baseline comfort level Progressing        Potential for Falls     Patient will remain free of falls Progressing        Prexisting or High Potential for Compromised Skin Integrity     Skin integrity is maintained or improved Progressing        SAFETY ADULT     Maintain or return to baseline ADL function Progressing     Maintain or return mobility status to optimal level Progressing

## 2018-07-19 PROBLEM — R53.81 DEBILITY: Status: ACTIVE | Noted: 2018-07-19

## 2018-07-19 PROBLEM — D62 ACUTE BLOOD LOSS ANEMIA: Status: ACTIVE | Noted: 2018-07-19

## 2018-07-19 PROBLEM — Z01.818 PRE-OP EVALUATION: Status: RESOLVED | Noted: 2018-07-16 | Resolved: 2018-07-19

## 2018-07-19 LAB
ALBUMIN SERPL BCP-MCNC: 3.1 G/DL (ref 3.5–5.7)
ALP SERPL-CCNC: 70 U/L (ref 55–165)
ALT SERPL W P-5'-P-CCNC: 12 U/L (ref 7–52)
ANION GAP SERPL CALCULATED.3IONS-SCNC: 4 MMOL/L (ref 4–13)
ANION GAP SERPL CALCULATED.3IONS-SCNC: 4 MMOL/L (ref 4–13)
AST SERPL W P-5'-P-CCNC: 34 U/L (ref 13–39)
BILIRUB SERPL-MCNC: 2.3 MG/DL (ref 0.2–1)
BUN SERPL-MCNC: 24 MG/DL (ref 7–25)
BUN SERPL-MCNC: 25 MG/DL (ref 7–25)
CALCIUM SERPL-MCNC: 8.6 MG/DL (ref 8.6–10.5)
CALCIUM SERPL-MCNC: 8.8 MG/DL (ref 8.6–10.5)
CHLORIDE SERPL-SCNC: 105 MMOL/L (ref 98–107)
CHLORIDE SERPL-SCNC: 105 MMOL/L (ref 98–107)
CO2 SERPL-SCNC: 26 MMOL/L (ref 21–31)
CO2 SERPL-SCNC: 26 MMOL/L (ref 21–31)
CREAT SERPL-MCNC: 1.18 MG/DL (ref 0.7–1.3)
CREAT SERPL-MCNC: 1.26 MG/DL (ref 0.7–1.3)
ERYTHROCYTE [DISTWIDTH] IN BLOOD BY AUTOMATED COUNT: 14.3 % (ref 11.5–14.5)
GFR SERPL CREATININE-BSD FRML MDRD: 53 ML/MIN/1.73SQ M
GFR SERPL CREATININE-BSD FRML MDRD: 58 ML/MIN/1.73SQ M
GLUCOSE SERPL-MCNC: 102 MG/DL (ref 65–99)
GLUCOSE SERPL-MCNC: 93 MG/DL (ref 65–99)
HCT VFR BLD AUTO: 18.4 % (ref 36.5–49.3)
HCT VFR BLD AUTO: 26 % (ref 36.5–49.3)
HGB BLD-MCNC: 6.6 G/DL (ref 14–18)
HGB BLD-MCNC: 9.3 G/DL (ref 14–18)
MCH RBC QN AUTO: 32.6 PG (ref 26–34)
MCHC RBC AUTO-ENTMCNC: 35.6 G/DL (ref 31–37)
MCV RBC AUTO: 92 FL (ref 81–99)
PLATELET # BLD AUTO: 129 THOUSANDS/UL (ref 149–390)
PMV BLD AUTO: 7.3 FL (ref 8.6–11.7)
POTASSIUM SERPL-SCNC: 3.8 MMOL/L (ref 3.5–5.5)
POTASSIUM SERPL-SCNC: 4 MMOL/L (ref 3.5–5.5)
PROT SERPL-MCNC: 5.2 G/DL (ref 6.4–8.9)
RBC # BLD AUTO: 2.02 MILLION/UL (ref 4.3–5.9)
SODIUM SERPL-SCNC: 135 MMOL/L (ref 134–143)
SODIUM SERPL-SCNC: 135 MMOL/L (ref 134–143)
WBC # BLD AUTO: 8.7 THOUSAND/UL (ref 4.8–10.8)

## 2018-07-19 PROCEDURE — 80053 COMPREHEN METABOLIC PANEL: CPT | Performed by: INTERNAL MEDICINE

## 2018-07-19 PROCEDURE — G8979 MOBILITY GOAL STATUS: HCPCS

## 2018-07-19 PROCEDURE — 84300 ASSAY OF URINE SODIUM: CPT | Performed by: INTERNAL MEDICINE

## 2018-07-19 PROCEDURE — 85018 HEMOGLOBIN: CPT | Performed by: NURSE PRACTITIONER

## 2018-07-19 PROCEDURE — 85027 COMPLETE CBC AUTOMATED: CPT | Performed by: NURSE PRACTITIONER

## 2018-07-19 PROCEDURE — 84156 ASSAY OF PROTEIN URINE: CPT | Performed by: INTERNAL MEDICINE

## 2018-07-19 PROCEDURE — 85014 HEMATOCRIT: CPT | Performed by: NURSE PRACTITIONER

## 2018-07-19 PROCEDURE — 99232 SBSQ HOSP IP/OBS MODERATE 35: CPT | Performed by: NURSE PRACTITIONER

## 2018-07-19 PROCEDURE — 82043 UR ALBUMIN QUANTITATIVE: CPT | Performed by: INTERNAL MEDICINE

## 2018-07-19 PROCEDURE — 97163 PT EVAL HIGH COMPLEX 45 MIN: CPT

## 2018-07-19 PROCEDURE — P9021 RED BLOOD CELLS UNIT: HCPCS

## 2018-07-19 PROCEDURE — 82570 ASSAY OF URINE CREATININE: CPT | Performed by: INTERNAL MEDICINE

## 2018-07-19 PROCEDURE — G8978 MOBILITY CURRENT STATUS: HCPCS

## 2018-07-19 PROCEDURE — 80048 BASIC METABOLIC PNL TOTAL CA: CPT | Performed by: ORTHOPAEDIC SURGERY

## 2018-07-19 PROCEDURE — 86920 COMPATIBILITY TEST SPIN: CPT

## 2018-07-19 RX ORDER — FUROSEMIDE 10 MG/ML
20 INJECTION INTRAMUSCULAR; INTRAVENOUS ONCE
Status: DISCONTINUED | OUTPATIENT
Start: 2018-07-19 | End: 2018-07-23 | Stop reason: HOSPADM

## 2018-07-19 RX ADMIN — MORPHINE SULFATE 3 MG: 4 INJECTION INTRAVENOUS at 23:06

## 2018-07-19 RX ADMIN — BISACODYL 10 MG: 10 SUPPOSITORY RECTAL at 22:33

## 2018-07-19 RX ADMIN — FONDAPARINUX SODIUM 2.5 MG: 2.5 INJECTION, SOLUTION SUBCUTANEOUS at 15:47

## 2018-07-19 RX ADMIN — CEFAZOLIN SODIUM 1000 MG: 1 SOLUTION INTRAVENOUS at 03:39

## 2018-07-19 RX ADMIN — MORPHINE SULFATE 3 MG: 4 INJECTION INTRAVENOUS at 09:54

## 2018-07-19 RX ADMIN — DEXTROSE AND SODIUM CHLORIDE 75 ML/HR: 5; 450 INJECTION, SOLUTION INTRAVENOUS at 09:53

## 2018-07-19 NOTE — ASSESSMENT & PLAN NOTE
· Nursing reports that patient has not been eating  · Very concerned for aspiration  · Will hold off on PO intake and meds at this time  Will continue with IVF  · Will discuss with wife  I left a message on her voicemail

## 2018-07-19 NOTE — ASSESSMENT & PLAN NOTE
· Likely secondary to volume depletion and anemia  Renal input is appreciated  · US renal does not show any hydronephrosis on right kidney  Left kidney is not visualized  · Creatinine is back to baseline today    · Will continue to monitor

## 2018-07-19 NOTE — ASSESSMENT & PLAN NOTE
· Suspected this to be secondary to volume depletion      · This is resolved after IVF  · Follow up with Na in AM

## 2018-07-19 NOTE — ASSESSMENT & PLAN NOTE
· Status post nail insertion of right femur post-op day #1  · Continue with Pain management - Tylenol for mild pain, Norco for moderate pain, IV morphine for severe pain  · PT and OT evaluation     · Patient is not a candidate for ARU

## 2018-07-19 NOTE — ASSESSMENT & PLAN NOTE
· Patient is status post mechanical fall prior to admission  · He has a resultant hip fracture as outlined above  · CT scan of the head was within normal limits  · No further workup  · Fall precaution  · PT/OT

## 2018-07-19 NOTE — CONSULTS
Consultation - Nephrology   Tami Ribeiro 82 y o  male MRN: [id number]  Unit/Bed#: -01 Encounter: [id number]      A/P:  1  Acute kidney injury: may be due to volume depletion and anemia  He has been transfused and given IVF and is approaching his baseline  2  Chronic kidney disease: unsure of status  He has a normal kidney ultrasound, however, is elderly and may have renal senescence  3  Hip fracture: s/p repair  4  Anemia: maintain adequate hemoglobin for perfusion  5  Advanced Alzheimer's dementia: lives in a memory unit         Thank you for allowing us to participate in the care of your patient  Please feel free to contact us regarding the care of this patient, or any other questions/concerns that may be applicable  Patient Active Problem List   Diagnosis    [facility]    Fall    Acquired hypothyroidism    Dyslipidemia    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 behavioral disturbance    Pre-op evaluation    Hyponatremia    Leukocytosis    Closed displaced intertrochanteric fracture of right femur ([facility]    MIRIAM (acute kidney injury) ([facility] )       History of Present Illness   Physician Requesting Consult: Gilbert Davis MD  Reason for Consult / Principal Problem: acute kidney injury and hyponatremia  Hx and PE limited by:   HPI: Tami Ribeiro is a 82y o  year old male who presents with a right intertrochanteric fracture after a fall at his SNF  He underwent a repair by orthopedics  He was noted to be hyponatremic on admission and was given IVF with improvement  He was also quite anemic and received PRBC  He has no known history of CKD and has a normal renal ultrasound  He had a decline in the perioperative periuod, however, is improving toward his baseline    History obtained from chart review and unobtainable from patient due to mental status    Review of Systems - Negative except as mentioned above in HPI, more specifics as mentioned below    Review of Systems - History obtained from chart review  The patient cannot give a history due to advanced Alzheimers    Historical Information   Past Medical History:   Diagnosis Date    [facility] failure [facility]     Cardiac disease     CVA [facility]cerebral vascular accident) [facility]     Disease of thyroid gland     hypothyroid    Sacral fracture ([facility] )      History reviewed  No pertinent surgical history  Social History   History   Alcohol Use No     History   Drug Use No     History   Smoking Status    Unknown If Ever Smoked   Smokeless Tobacco    Never Used     History reviewed  No pertinent family history   as the patient could not give me any details due to mental status    Meds/Allergies   all current active meds have been reviewed, current meds: Current Facility-Administered Medications   Medication Dose Route Frequency    acetaminophen (TYLENOL) tablet 650 mg  650 mg Oral Q6H PRN    bisacodyl (DULCOLAX) rectal suppository 10 mg  10 mg Rectal Q24H    ceFAZolin (ANCEF) IVPB (premix) 1,000 mg  1,000 mg Intravenous Q8H    dextrose 5 % and sodium chloride 0 45 % infusion  75 mL/hr Intravenous Continuous    docusate sodium (COLACE) capsule 100 mg  100 mg Oral BID    ezetimibe (ZETIA) tablet 10 mg  10 mg Oral Daily    fondaparinux (ARIXTRA) subcutaneous injection 2 5 mg  2 5 mg Subcutaneous Daily    HYDROcodone-acetaminophen (NORCO) 5-325 mg per tablet 1 tablet  1 tablet Oral Q6H PRN    lactated ringers infusion  75 mL/hr Intravenous Continuous    levothyroxine tablet 88 mcg  88 mcg Oral Early Morning    morphine (PF) 4 mg/mL injection 3 mg  3 mg Intravenous Q4H PRN    multivitamin-minerals (CENTRUM) tablet 1 tablet  1 tablet Oral Daily    ondansetron (ZOFRAN) injection 4 mg  4 mg Intravenous Q6H PRN    and PTA meds:  Prescriptions Prior to Admission   Medication    acetaminophen (TYLENOL) 500 mg tablet    aspirin 81 mg chewable tablet    bisacodyl (DULCOLAX) 10 mg suppository    ezetimibe (ZETIA) 10 mg tablet    levothyroxine 88 mcg tablet    Multiple Vitamins-Minerals (MULTIVITAMIN ADULTS PO)         Allergies   Allergen Reactions    Corticosteroids     Prednisone Other (See Comments)     Dizziness    Sulfa Antibiotics Fever       Objective     Intake/Output Summary (Last 24 hours) at 07/19/18 0936  Last data filed at 07/19/18 0615   Gross per 24 hour   Intake             1000 ml   Output             1250 ml   Net             -250 ml       Invasive Devices:   Urethral Catheter Latex 18 Fr  (Active)   Site Assessment Clean;Skin intact 7/19/2018  2:01 AM   Collection Container Standard drainage bag 7/19/2018  2:01 AM   Securement Method Leg strap 7/19/2018  2:01 AM   Output (mL) 400 mL 7/19/2018  6:15 AM       Physical Exam      I/O last 3 completed shifts: In: 1000 [I V :1000]  Out: 1700 [Urine:1700]    Vitals:    07/19/18 0704   BP: (!) 92/45   Pulse: 90   Resp: 18   Temp: (!) 97 2 °F (36 2 °C)   SpO2: 93%       Gen: in NAD,unresponsive to verbal questioning  HEENT: no sclerous icterus, MMM, neck supple  CV: +S1/S2, RRR  Lungs: CTA bilaterally  Abd: +BS, soft NT/ND  Ext: all four extremities are warm  Skin: no rashes noted  Neuro: could not be assessed  Current Weight: Weight - Scale: 62 2 kg (137 lb 3 2 oz)  First Weight: Weight - Scale: 62 2 kg (137 lb 3 2 oz)    Lab Results:  I have personally reviewed pertinent labs      CBC: Lab Results   Component Value Date    WBC 8 70 07/19/2018    HGB 6 6 (LL) 07/19/2018    HCT 18 4 (L) 07/19/2018    MCV 92 07/19/2018     (L) 07/19/2018    MCH 32 6 07/19/2018    MCHC 35 6 07/19/2018    RDW 14 3 07/19/2018    MPV 7 3 (L) 07/19/2018     CMP: Lab Results   Component Value Date     07/19/2018    K 3 8 07/19/2018     07/19/2018    CO2 26 07/19/2018    ANIONGAP 4 07/19/2018    BUN 25 07/19/2018    CREATININE 1 26 07/19/2018    GLUCOSE 102 (H) 07/19/2018    CALCIUM 8 6 07/19/2018    EGFR 53 07/19/2018     Phosphorus: No results found for: PHOS  Magnesium: No results found for: MG  Urinalysis: No results found for: COLORU, CLARITYU, SPECGRAV, PHUR, LEUKOCYTESUR, NITRITE, PROTEINUA, GLUCOSEU, KETONESU, BILIRUBINUR, BLOODU  Ionized Calcium: No results found for: CAION  Coagulation: No results found for: PT, INR, APTT  Troponin: No results found for: TROPONINI  ABG: No results found for: PHART, LCY9HNF, PO2ART, ORD5DDB, P5NTURAD, BEART, SOURCE      Results from last 7 days  Lab Units 07/19/18  0501 07/18/18  0519 07/17/18  0624 07/16/18  1458   SODIUM mmol/L 135 135 131* 130*   POTASSIUM mmol/L 3 8 4 8 5 2 4 6   CHLORIDE mmol/L 105 104 98 95*   CO2 mmol/L 26 23 24 28   BUN mg/dL 25 32* 26* 20   CREATININE mg/dL 1 26 1 66* 1 58* 1 14   CALCIUM mg/dL 8 6 8 8 9 4 9 5   TOTAL PROTEIN g/dL  --   --   --  6 6   BILIRUBIN TOTAL mg/dL  --   --   --  0 50   ALK PHOS U/L  --   --   --  122   ALT U/L  --   --   --  18   AST U/L  --   --   --  20   GLUCOSE RANDOM mg/dL 102* 116* 142* 176*       Radiology review:  Procedure: Ct Chest Abdomen Pelvis Wo Contrast    Result Date: 7/16/2018  Narrative: INDICATION:  Trauma  ORDERING PROVIDER:  Meek Ramon  TECHNIQUE:  CT of the chest, abdomen, and pelvis was performed without intravenous contrast   Coronal and sagittal reconstructions were generated  Please note that lack of intravenous contrast limits evaluation for vascular or solid organ injury  Automated mA/kV exposure control was utilized and patient examination was performed in strict accordance with principles of ALARA  RADIATION AMOUNT:  527 70 mGy-cm  COMPARISON:  None available  FINDINGS: Chest: The heart is normal in size without pericardial effusion  Thoracic lymph nodes are not enlarged  There is calcification in the coronary arteries  Constipation on aortic and mitral valves  There is no pleural effusion, pleural thickening, or pneumothorax  The airways are patent  Lungs are clear without consolidation, interstitial disease, or suspicious nodules  Abdomen: The liver, spleen, pancreas, adrenal glands, and kidneys are suboptimally evaluated on these unenhanced images, but demonstrate no acute pathology  No acute pancreatitis  There are multiple calcified gallstones  No gallbladder wall thickening  No pericholecystic fluid  No gross biliary duct dilatation  No hydronephrosis bilaterally  The solid abdominal organs are grossly unremarkable  No findings to indicate acute injury/laceration  No perihepatic or perisplenic fluid  No retroperitoneal hematoma  No gross evidence of mesenteric hematoma  No definite bowel wall thickening, to the limits of this study  No free air or significant free fluid  Examination limited without contrast  There is no free air or lymph node enlargement  Abdominal aorta is not aneurysmal  Pelvis: There is no bowel wall thickening or obstruction  Moderate volume of stool throughout the colon incidentally noted  No herniated bowel loops  No focal inflammatory changes  There is no free fluid  Lymph nodes are not enlarged  Urinary bladder is unremarkable  Skeleton: Comminuted intertrochanteric fracture of the right femur  Mild associated soft tissue hematoma  Severe degenerative arthropathy in the glenohumeral joints bilaterally incidentally noted  Impression: Intertrochanteric fracture of the right femur  No other evidence of acute traumatic injury  Limited without contrast  Cholelithiasis incidentally noted  Signed by SVETA Swenson  Procedure: Xr Femur 2 Views Right    Result Date: 7/16/2018  Narrative: INDICATION:  Patient fell, deformity  ORDERING PROVIDER:  Metaristeo Splinter  TECHNIQUE:  Two views (four images) of the right femur  COMPARISON:  None available  FINDINGS: Complete comminuted mildly laterally displaced intratrochanteric fracture is noted of the right proximal femur  No dislocation of the femoral head from the bony acetabulum   High riding proximal femoral metadiaphysis overlies the femoral neck with right angle formed due to rotation between the femoral head/neck and high riding metaphysis  One of the larger bony fragments includes the lesser trochanter approximate 6 2 x 3 7 cm  Visualized bony pelvis appears intact  Severe common femoral arterial and distal branch calcifications are noted  No radiopaque foreign body seen  Moderate right lateral hip increased soft tissue density indicates swelling/hematoma  No subcutaneous emphysema noted  Moderate superior right hip joint space narrowing and acetabular roof mild subchondral sclerosis is seen  Osteopenia suggested  Severe right knee tricompartmental degenerative seen  Incompletely seen proximal tibia horizontally oriented threaded cancellus screws and medial metal plate are seen  Impression: Complete comminuted intertrochanteric proximal femoral fracture as described with mild lateral displacement without dislocation  Signed by Gabriela Champion MD    Procedure: Ct Head Without Contrast    Result Date: 7/16/2018  Narrative: INDICATION:  Head trauma, mental status change  ORDERING PROVIDER:  Shane Brenner  TECHNIQUE:  CT of the brain was performed without contrast   Automated mA/kV exposure control was utilized and patient examination was performed in strict accordance with principles of ALARA  RADIATION AMOUNT:  2005 20 mGy-cm  COMPARISON:  CT sinus 8/27/2012  FINDINGS: There is no acute intracranial hemorrhage, midline shift, mass effect, or extra-axial fluid collection  Gray-white differentiation is preserved  Brain volume and ventricular system are within normal limits for age  Moderate patchy areas of low-attenuation are seen in the subcortical and deep periventricular white matter suggesting areas of chronic microvascular ischemia  The skull base and calvarium are intact  The visualized paranasal sinuses are unremarkable  Inferior left mastoids are partially opacified  Impression: No acute intracranial findings    If symptoms persist or if further imaging is clinically indicated, consider follow-up CT or MRI  Low attenuation in the white matter bilaterally, age indeterminate  This is most commonly from small vessel ischemic disease Signed by SVETA Goldman  Procedure: Us Retroperitoneal Complete    Result Date: 7/18/2018  Narrative: INDICATION:  Acute kidney injury TECHNIQUE:  Ultrasound of the kidneys  COMPARISON:  CT C/A/P 07/16/2018 FINDINGS: Technically difficult exam   Patient is combative, in traction, and unable to change position  The right kidney measures 8 3 cm in length  There is incomplete imaging of the lower pole due to overlying bowel gas  Renal cortical echotexture is normal   There is no hydronephrosis  There are no stones  The left kidney is not visualized  Gallstones are incidentally visualized  Impression: No right-sided hydronephrosis  The left kidney is not visualized  Cholelithiasis  Signed by Ulises Santana        EKG, Pathology, and Other Studies: reviewed      Counseling / Coordination of Care  Total floor / unit time spent today 35 minutes  Greater than 50% of total time was spent with the patient and / or family counseling and / or coordination of care   A description of the counseling / coordination of care: follows    Jarod Arias MD

## 2018-07-19 NOTE — RESPIRATORY THERAPY NOTE
RT Protocol Note  Hany Loose 82 y o  male MRN: [id number]  Unit/Bed#: -01 Encounter: [id number]    Assessment    Principal Problem:    Closed displaced intertrochanteric fracture of right femur [facility])  Active Problems:    Fall    Acquired hypothyroidism    Dyslipidemia    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 behavioral disturbance    Pre-op evaluation    Hyponatremia    Leukocytosis    MIRIAM (acute kidney injury) ([facility] )      Home Pulmonary Medications:  Pt was not able to understand the instructions at this time   Home Devices/Therapy:  (none)    Past Medical History:   Diagnosis Date    Acute kidney failure ([facility] )     Cardiac disease     CVA (cerebral vascular accident) ([facility] )     Disease of thyroid gland     hypothyroid    Sacral fracture ([place] [address] )      Social History     Social History    Marital status: /Civil Union     Spouse name: N/A    Number of children: N/A    Years of education: N/A     Social History Main Topics    Smoking status: Unknown If Ever Smoked    Smokeless tobacco: Never Used    Alcohol use No    Drug use: No    Sexual activity: Not Asked     Other Topics Concern    None     Social History Narrative    None       Subjective         Objective    Physical Exam:   Assessment Type: Assess only  General Appearance: Drowsy, Sedated  Respiratory Pattern: Normal  Chest Assessment: Chest expansion symmetrical  Bilateral Breath Sounds: Clear  Cough: (P) None    Vitals:  Blood pressure 102/60, pulse 88, temperature (!) 96 3 °F (35 7 °C), resp  rate 16, height 5' 7" (1 702 m), weight 62 2 kg (137 lb 3 2 oz), SpO2 97 %  Imaging and other studies: I have personally reviewed pertinent reports              Plan       Airway Clearance Plan: Incentive Spirometer     Resp Comments: pt did not respond to any instructions at this time

## 2018-07-19 NOTE — SOCIAL WORK
Patient dicussed during discharge planning meeting  Patient not medically cleared for discharge today  Patient delclined by ARU  Patient accepted to [facility] for STR when cleared  Message left for patients wife to discuss same and obtain her facility preference  CM to follow

## 2018-07-19 NOTE — PROGRESS NOTES
Progress Note - Dottie Fernandez 1935, 82 y o  male MRN: [id number]    Unit/Bed#: -01 Encounter: [id number]    Primary Care Provider: Charleen Sauer MD   Date and time admitted to hospital: 7/16/2018  7:28 PM        * Closed displaced intertrochanteric fracture of right femur [facility])   Assessment & Plan    · Status post nail insertion of right femur post-op day #1  · Continue with Pain management - Tylenol for mild pain, Norco for moderate pain, IV morphine for severe pain  · PT and OT evaluation  · Patient is not a candidate for ARU          Acute blood loss anemia   Assessment & Plan    · Secondary to surgery  · Patient will be receiving 2 units of packed red blood cells today  · Follow-up with hemoglobin closely  MIRIAM (acute kidney injury) ([facility] [address] )   Assessment & Plan    · Likely secondary to volume depletion and anemia  Renal input is appreciated  · US renal does not show any hydronephrosis on right kidney  Left kidney is not visualized  · Creatinine is back to baseline today  · Will continue to monitor          [address] [place] [address]    · Patient is status post mechanical fall prior to admission  · He has a resultant hip fracture as outlined above  · CT scan of the head was within normal limits  · No further workup  · Fall precaution  · PT/OT         Debility   Assessment & Plan    · Nursing reports that patient has not been eating  · Very concerned for aspiration  · Will hold off on PO intake and meds at this time  Will continue with IVF  · Will discuss with wife  I left a message on her voicemail  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 behavioral disturbance   Assessment & Plan    · Patient is somewhat lethargic today  Will monitor closely  · Wife also reports he is nonverbal  · Continue supportive care         Hyponatremia   Assessment & Plan    · Suspected this to be secondary to volume depletion      · This is resolved after IVF  · Follow up with Na in AM Leukocytosis   Assessment & Plan    · Most likely reactive  This is resolved  · Will monitor with daily CBCs  · Afebrile, UA is clean  Dyslipidemia   Assessment & Plan    · Continue Zetia        Acquired hypothyroidism   Assessment & Plan    · Continue Synthroid            VTE Pharmacologic Prophylaxis:   Pharmacologic: Fondaparinux (Arixtra)  Mechanical VTE Prophylaxis in Place: Yes    Patient Centered Rounds: I have performed bedside rounds with nursing staff today  Discussions with Specialists or Other Care Team Provider:  Nursing     Education and Discussions with Family / Patient:  Wife     Time Spent for Care: 20 minutes  More than 50% of total time spent on counseling and coordination of care as described above  Current Length of Stay: 3 day(s)    Current Patient Status: Inpatient   Certification Statement: The patient will continue to require additional inpatient hospital stay due to blood transfusion    Discharge Plan:  Pending hospital course     Code Status: Level 3 - DNAR and DNI      Subjective:    unable to obtain  Non-verbal at baseline     Objective:     Vitals:   Temp (24hrs), Av °F (35 6 °C), Min:93 3 °F (34 1 °C), Max:97 5 °F (36 4 °C)    HR:  [72-99] 82  Resp:  [16-20] 20  BP: ()/(42-69) 90/60  SpO2:  [93 %-100 %] 93 %  Body mass index is 21 49 kg/m²  Input and Output Summary (last 24 hours): Intake/Output Summary (Last 24 hours) at 18 1232  Last data filed at 18 0615   Gross per 24 hour   Intake             1000 ml   Output             1250 ml   Net             -250 ml       Physical Exam:     Physical Exam   Constitutional: He appears well-developed  No distress  Cachetic      HENT:   Head: Normocephalic and atraumatic  Mouth/Throat: Oropharynx is clear and moist    Eyes: Conjunctivae and EOM are normal  Pupils are equal, round, and reactive to light  Neck: Normal range of motion  Neck supple  No thyromegaly present     Cardiovascular: Normal rate, regular rhythm, normal heart sounds and intact distal pulses  Pulmonary/Chest: Effort normal    Coarse     Abdominal: Soft  Bowel sounds are normal  He exhibits no distension  There is no tenderness  Musculoskeletal: Normal range of motion  He exhibits edema (+1-2 on right LE)  He exhibits no deformity  Right hip dressing intact     Neurological: He has normal reflexes  Somewhat lethargic     Skin: Skin is warm and dry  No erythema  Psychiatric:   Unable to assess     Vitals reviewed  Additional Data:     Labs:      Results from last 7 days  Lab Units 07/19/18  0501  07/17/18  0624   WBC Thousand/uL 8 70  < > 12 10*   HEMOGLOBIN g/dL 6 6*  < > 11 6*   HEMATOCRIT % 18 4*  < > 34 6*   PLATELETS Thousands/uL 129*  < > 229   NEUTROS PCT %  --   --  84*   LYMPHS PCT %  --   --  6*   MONOS PCT %  --   --  10   EOS PCT %  --   --  0   < > = values in this interval not displayed  Results from last 7 days  Lab Units 07/19/18  0501  07/16/18  1458   SODIUM mmol/L 135  < > 130*   POTASSIUM mmol/L 3 8  < > 4 6   CHLORIDE mmol/L 105  < > 95*   CO2 mmol/L 26  < > 28   BUN mg/dL 25  < > 20   CREATININE mg/dL 1 26  < > 1 14   CALCIUM mg/dL 8 6  < > 9 5   TOTAL PROTEIN g/dL  --   --  6 6   BILIRUBIN TOTAL mg/dL  --   --  0 50   ALK PHOS U/L  --   --  122   ALT U/L  --   --  18   AST U/L  --   --  20   GLUCOSE RANDOM mg/dL 102*  < > 176*   < > = values in this interval not displayed  Results from last 7 days  Lab Units 07/17/18  0624   INR  1 05                 * I Have Reviewed All Lab Data Listed Above  * Additional Pertinent Lab Tests Reviewed:  John[facility] 68 Admission Reviewed    Recent Cultures (last 7 days):       Results from last 7 days  Lab Units 07/17/18  1453   URINE CULTURE  No Growth <1000 cfu/mL       Last 24 Hours Medication List:     Current Facility-Administered Medications:  acetaminophen 650 mg Oral Q6H PRN Thanh Faulkner MD    bisacodyl 10 mg Rectal Q24H Lindsay Acosta MD    cefazolin 1,000 mg Intravenous Q8H Diandra Somers DO Last Rate: 1,000 mg (07/19/18 0339)   dextrose 5 % and sodium chloride 0 45 % 75 mL/hr Intravenous Continuous Diandra Somers DO Last Rate: 75 mL/hr (07/19/18 0990)   docusate sodium 100 mg Oral BID Lindsay Acosta MD    ezetimibe 10 mg Oral Daily Lindsay Acosta MD    fondaparinux 2 5 mg Subcutaneous Daily Diandra Somers DO    furosemide 20 mg Intravenous Once MESSI Hanks    HYDROcodone-acetaminophen 1 tablet Oral Q6H PRN Lindsay Acosta MD    levothyroxine 88 mcg Oral Early Morning Lindsay Acosta MD    morphine injection 3 mg Intravenous Q4H PRN Diandra Somers DO    multivitamin-minerals 1 tablet Oral Daily Lindsay Acosta MD    ondansetron 4 mg Intravenous Q6H PRN Lindsay Acosta MD         Today, Patient Was Seen By: MESSI Hanks    ** Please Note: Dictation voice to text software may have been used in the creation of this document   **

## 2018-07-19 NOTE — PLAN OF CARE
Problem: PHYSICAL THERAPY ADULT  Goal: Performs mobility at highest level of function for planned discharge setting  See evaluation for individualized goals  Treatment/Interventions: ADL retraining, Functional transfer training, LE strengthening/ROM, Therapeutic exercise, Endurance training, Cognitive reorientation, Patient/family training, Equipment eval/education, Bed mobility, Gait training, Compensatory technique education, OT, ST, Spoke to case management  Nicanor Schmidt, PT            See flowsheet documentation for full assessment, interventions and recommendations  Outcome: Not Progressing  Prognosis: Guarded  Problem List: Decreased strength, Decreased range of motion, Decreased endurance, Impaired balance, Decreased mobility, Decreased coordination, Decreased cognition, Decreased safety awareness, Orthopedic restrictions, Pain  Assessment: Pt is 82 y o  male seen for PT evaluation s/p admit to [facility] on 7/16/2018 w/ Closed displaced intertrochanteric fracture of right femur ([address] )  PT consulted to assess pt's functional mobility and d/c needs  Order placed for PT eval and tx, w/ up as tolerated and TTWB R LE order  Comorbidities affecting pt's physical performance at time of assessment include: early onset Alzheimer's dementia, falls with closed fx R hip s/p pinning, hypothyroidism, hyponatremia, non-verbal for communictaion, leukocytosis  pt was dependent for all mobility tasks  Personal factors affecting pt at time of IE include: communication issues, decreased cognition, positive fall history, decreased initiation and engagement and inability to actviely participate in interventions due to cognition   Please find objective findings from PT assessment regarding body systems outlined above with impairments and limitations including weakness, decreased ROM, impaired balance, decreased endurance, impaired coordination, gait deviations, pain, decreased activity tolerance, decreased functional mobility tolerance, decreased safety awareness, impaired judgement, fall risk, orthopedic restrictions, decreased skin integrity and decreased cognition  The following objective measures performed on IE also reveal limitations: Barthel Index: 0/100  Pt's clinical presentation is currently unstable/unpredictable seen in pt's presentation of observed pain, complex PMH, HPI  Pt to benefit from continued PT tx to address deficits as defined above and maximize level of functional independent mobility and consistency  From PT/mobility standpoint, recommendation at time of d/c would be long term skilled nursing/PT pending progress in order to facilitate return to PLOF  Recommendation: Long-term skilled PT, Long-term skilled nursing home placement     PT - OK to Discharge: No    See flowsheet documentation for full assessment

## 2018-07-19 NOTE — PHYSICAL THERAPY NOTE
Physical Therapy Evaluation     Patient's Name: [facility]    Admitting Diagnosis  Closed displaced intertrochanteric fracture of right femur, initial encounter [facility]) [S72 141A]    Problem List  Patient Active Problem List   Diagnosis    [facility]    Fall    Acquired hypothyroidism    Dyslipidemia    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 behavioral disturbance    Pre-op evaluation    Hyponatremia    Leukocytosis    Closed displaced [facility]trochanteric [facility] right femur [facility]    MIRIAM (acute kidney injury) ([place] [address] )       Past Medical History  Past Medical History:   Diagnosis Date    Acute kidney failure ([facility] )     Cardiac disease     CVA (cerebral vascular accident) ([facility] )     Disease of thyroid gland     hypothyroid    Sacral fracture ([place] [address] )        Past Surgical History  History reviewed  No pertinent surgical history  07/19/18 0819   Note Type   Note type Eval/Treat   Pain Assessment   Pain Assessment Unable to assess  (observed grimmace, resistance with RLE PROM)   Pain Score (pt  unable to quantify)   Pain Type Acute pain;Surgical pain   Pain Location Hip   Pain Orientation Right   Home Living   Type of Home Assisted living  ([facility], [facility])   Home Layout One level   Bathroom Shower/Tub Walk-in shower   Bathroom Toilet Raised   Bathroom Equipment Grab bars in shower;Grab bars around toilet;Commode   [address]; Wheelchair-manual;Grab bars   Prior Function   Level of [place] Total dependent   Lives With Facility staff   Receives Help From Personal care attendant   ADL Assistance Needs assistance   IADLs Needs assistance   Falls in the last 6 months 1 to 4   Vocational Retired   Restrictions/Precautions   [place] Bearing Precautions Per Order Yes   RUE [place] Bearing Per Order FWB   LUE Weight Bearing Per Order FWB   RLE Weight Bearing Per Order TTWB   LLE Weight Bearing Per Order FWB   Cognition   Overall Cognitive Status Unable to assess   Arousal/Participation Lethargic   Orientation Level Disoriented to place; Disoriented to time;Disoriented to situation   Memory Unable to assess   Following Commands Unable to follow one step commands   RUE Assessment   RUE Assessment WFL   LUE Assessment   LUE Assessment WFL   RLE Assessment   RLE Assessment X   LLE Assessment   LLE Assessment WFL   Bed Mobility   Rolling R Unable to assess   Rolling L 1  Dependent   Additional items Assist x 2   Supine to Sit Unable to assess   Endurance Deficit   Endurance Deficit Yes   Activity Tolerance   Activity Tolerance Patient limited by pain;Treatment limited secondary to medical complications (Comment)   Assessment   Prognosis Guarded   Problem List Decreased strength;Decreased range of motion;Decreased endurance; Impaired balance;Decreased mobility; Decreased coordination;Decreased cognition;Decreased safety awareness;Orthopedic restrictions;Pain   Assessment Pt is 82 y o  male seen for PT evaluation s/p admit to [address] [place] [address] on 7/16/2018 w/ Closed displaced intertrochanteric fracture of right femur ([address] )  PT consulted to assess pt's functional mobility and d/c needs  Order placed for PT eval and tx, w/ up as tolerated and TTWB R LE order  Comorbidities affecting pt's physical performance at time of assessment include: early onset Alzheimer's dementia, falls with closed fx R hip s/p pinning, hypothyroidism, hyponatremia, non-verbal for communictaion, leukocytosis  pt was dependent for all mobility tasks  Personal factors affecting pt at time of IE include: communication issues, decreased cognition, positive fall history, decreased initiation and engagement and inability to actviely participate in interventions due to cognition   Please find objective findings from PT assessment regarding body systems outlined above with impairments and limitations including weakness, decreased ROM, impaired balance, decreased endurance, impaired coordination, gait deviations, pain, decreased activity tolerance, decreased functional mobility tolerance, decreased safety awareness, impaired judgement, fall risk, orthopedic restrictions, decreased skin integrity and decreased cognition  The following objective measures performed on IE also reveal limitations: Barthel Index: 0/100  Pt's clinical presentation is currently unstable/unpredictable seen in pt's presentation of observed pain, complex PMH, HPI  Pt to benefit from continued PT tx to address deficits as defined above and maximize level of functional independent mobility and consistency  From PT/mobility standpoint, recommendation at time of d/c would be long term skilled nursing/PT pending progress in order to facilitate return to PLOF  Goals   Patient Goals unknown    STG Expiration Date 07/26/18   Short Term Goal #1 Pt will perform bed mobility tasks to max A of 2 to decrease burden of care  Perform transfers to max A of 2 with RW to decrease risk of skin breakdown with change of position  Perform ambulation with RW, for 5 feet, max A of 2 to improve activity tolerance  Patient will tolerate AAROM, PROM BUE's, BLE's to decrease risk of contractures and facilitate joint proprioception  Patient will demonstrate increased static sitting balance to poor+  manual cues 100% of treatment session to facilitate activation of stabilizing muscles and prepare for safe transfers  LTG Expiration Date 08/02/18   Long Term Goal #1 Pt will perform bed mobility tasks to mod  A of 2 to decrease burden of care  Perform transfers to mod A of 2 with RW to decrease risk of skin breakdown with change of position  Perform ambulation with RW, for 10 feet, max A of 2 to improve activity tolerance  Patient will tolerate AAROM, PROM BUE's, BLE's to decrease risk of contractures and facilitate joint proprioception   Patient will demonstrate increased static sitting balance to fair-  manual cues 100% of treatment session to facilitate activation of stabilizing muscles and prepare for safe transfers  Plan   Treatment/Interventions ADL retraining;Functional transfer training;LE strengthening/ROM; Therapeutic exercise; Endurance training;Cognitive reorientation;Patient/family training;Equipment eval/education; Bed mobility;Gait training; Compensatory technique education;OT;ST;Spoke to case management   PT Frequency Other (Comment)  (5-7x/week)   Recommendation   Recommendation Long-term skilled PT;Long-term skilled nursing home placement   PT - OK to Discharge No   Barthel Index   Feeding 0   Bathing 0   Grooming Score 0   Dressing Score 0   Bladder Score 0   Bowels Score 0   Toilet Use Score 0   Transfers (Bed/Chair) Score 0   Mobility (Level Surface) Score 0   Stairs Score 0   Barthel Index Score 0   Addendum: 50% WB RLE, TTWB entered in error  Hailee Rivers PT

## 2018-07-19 NOTE — PROGRESS NOTES
Progress Note - Orthopedics   Tasia De La Vega 82 y o  male MRN: [id number]  Unit/Bed#: -01 Encounter: [id number]    Assessment:  POD 1 s/p Right TFN     Plan:  Cont PT/OT  Awaiting rehab placement     Weight bearin% right lower    VTE Pharmacologic Prophylaxis: Fondaparinux (Arixtra)  VTE Mechanical Prophylaxis: sequential compression device    Subjective: 2 units RBC for 6 6 Hgb    Vitals: Blood pressure 92/60, pulse 84, temperature 97 5 °F (36 4 °C), temperature source Tympanic, resp  rate 20, height 5' 7" (1 702 m), weight 62 2 kg (137 lb 3 2 oz), SpO2 90 %  ,Body mass index is 21 49 kg/m²        Intake/Output Summary (Last 24 hours) at 18 1506  Last data filed at 18 1300   Gross per 24 hour   Intake             1000 ml   Output             1450 ml   Net             -450 ml       Invasive Devices     Peripheral Intravenous Line            Peripheral IV 18 Right Arm 3 days          Drain            Urethral Catheter Latex 18 Fr  1 day              Exam:  Dorsiflexion positive  Plantarflexion positive  AO x 0      Lab, Imaging and other studies:   CBC:   Lab Results   Component Value Date    WBC 8 70 2018    HGB 6 6 (LL) 2018    HCT 18 4 (L) 2018    MCV 92 2018     (L) 2018    MCH 32 6 2018    MCHC 35 6 2018    RDW 14 3 2018    MPV 7 3 (L) 2018     CMP:   Lab Results   Component Value Date     2018     2018    CO2 26 2018    ANIONGAP 4 2018    BUN 25 2018    CREATININE 1 26 2018    GLUCOSE 102 (H) 2018    CALCIUM 8 6 2018    EGFR 53 2018

## 2018-07-19 NOTE — ASSESSMENT & PLAN NOTE
· Patient is somewhat lethargic today  Will monitor closely     · Wife also reports he is nonverbal  · Continue supportive care

## 2018-07-19 NOTE — PLAN OF CARE
Problem: Nutrition/Hydration-ADULT  Goal: Nutrient/Hydration intake appropriate for improving, restoring or maintaining nutritional needs  Monitor and assess patient's nutrition/hydration status for malnutrition (ex- brittle hair, bruises, dry skin, pale skin and conjunctiva, muscle wasting, smooth red tongue, and disorientation)  Collaborate with interdisciplinary team and initiate plan and interventions as ordered  Monitor patient's weight and dietary intake as ordered or per policy  Utilize nutrition screening tool and intervene per policy  Determine patient's food preferences and provide high-protein, high-caloric foods as appropriate  INTERVENTIONS:  - Monitor oral intake, urinary output, labs, and treatment plans  - Assess nutrition and hydration status and recommend course of action  - Evaluate amount of meals eaten  - Assist patient with eating if necessary   - Allow adequate time for meals  - Recommend/ encourage appropriate diets, oral nutritional supplements, and vitamin/mineral supplements  - Order, calculate, and assess calorie counts as needed  - Recommend, monitor, and adjust tube feedings and TPN/PPN based on assessed needs  - Assess need for intravenous fluids  - Provide specific nutrition/hydration education as appropriate  - Include patient/family/caregiver in decisions related to nutrition   Outcome: Not Progressing      Comments: Pt not alert when visited today  Recommend speech evaluation  Downgraded dt to Dysphagia 1 puree, honeyshake supplement at dinner

## 2018-07-19 NOTE — PROGRESS NOTES
PT Morning HGB was 6 6 and an order was placed to infused 2 units of PRBC's, First unit infused and pt tolerated well,  Second unit currently infusing pt tolerating well, wife at bedside, MD in to see pt and family and update on plan of care, wife agrees

## 2018-07-19 NOTE — ASSESSMENT & PLAN NOTE
· Secondary to surgery  · Patient will be receiving 2 units of packed red blood cells today  · Follow-up with hemoglobin closely

## 2018-07-20 PROBLEM — R13.19 OTHER DYSPHAGIA: Chronic | Status: ACTIVE | Noted: 2018-07-20

## 2018-07-20 PROBLEM — S30.22XA SCROTAL HEMATOMA: Status: ACTIVE | Noted: 2018-07-20

## 2018-07-20 LAB
CREAT UR-MCNC: 291 MG/DL
HCT VFR BLD AUTO: 23.4 % (ref 36.5–49.3)
HCT VFR BLD AUTO: 33.6 % (ref 36.5–49.3)
HGB BLD-MCNC: 11.9 G/DL (ref 14–18)
HGB BLD-MCNC: 8.4 G/DL (ref 14–18)
MICROALBUMIN UR-MCNC: 85.9 MG/L (ref 0–20)
MICROALBUMIN/CREAT 24H UR: 30 MG/G CREATININE (ref 0–30)
PROT UR-MCNC: 61 MG/DL
PROT/CREAT UR: 0.21 MG/G{CREAT} (ref 0–0.1)
SODIUM 24H UR-SCNC: 38 MOL/L

## 2018-07-20 PROCEDURE — 85018 HEMOGLOBIN: CPT | Performed by: NURSE PRACTITIONER

## 2018-07-20 PROCEDURE — P9021 RED BLOOD CELLS UNIT: HCPCS

## 2018-07-20 PROCEDURE — 99232 SBSQ HOSP IP/OBS MODERATE 35: CPT | Performed by: NURSE PRACTITIONER

## 2018-07-20 PROCEDURE — 97110 THERAPEUTIC EXERCISES: CPT

## 2018-07-20 PROCEDURE — 85014 HEMATOCRIT: CPT | Performed by: ORTHOPAEDIC SURGERY

## 2018-07-20 PROCEDURE — 85018 HEMOGLOBIN: CPT | Performed by: ORTHOPAEDIC SURGERY

## 2018-07-20 PROCEDURE — 85014 HEMATOCRIT: CPT | Performed by: NURSE PRACTITIONER

## 2018-07-20 RX ORDER — FUROSEMIDE 10 MG/ML
20 INJECTION INTRAMUSCULAR; INTRAVENOUS ONCE
Status: COMPLETED | OUTPATIENT
Start: 2018-07-20 | End: 2018-07-20

## 2018-07-20 RX ADMIN — LEVOTHYROXINE SODIUM 88 MCG: 88 TABLET ORAL at 06:41

## 2018-07-20 RX ADMIN — FUROSEMIDE 20 MG: 10 INJECTION, SOLUTION INTRAVENOUS at 18:44

## 2018-07-20 RX ADMIN — BISACODYL 10 MG: 10 SUPPOSITORY RECTAL at 20:19

## 2018-07-20 NOTE — PROGRESS NOTES
Progress Note - Orthopedics   Ilan Guerra 82 y o  male MRN: [id number]  Unit/Bed#: -01 Encounter: [id number]    Assessment:  Postop day 2 , status post TFN right hip        Plan:  Out of bed  50% weight-bearing right lower extremity  PT/OT  Awaiting [facility]HAB    Weight bearin% wb right lower extremity    VTE Pharmacologic Prophylaxis: Sequential compression device (Venodyne)  and Fondaparinux (Arixtra)  VTE Mechanical Prophylaxis: sequential compression device    Subjective:  Patient is at his baseline  No complaints  Vitals: Blood pressure (!) 92/42, pulse (!) 46, temperature (!) 96 8 °F (36 °C), temperature source Tympanic, resp  rate 16, height 5' 7" (1 702 m), weight 62 2 kg (137 lb 3 2 oz), SpO2 99 %  ,Body mass index is 21 49 kg/m²        Intake/Output Summary (Last 24 hours) at 18 0756  Last data filed at 18 0601   Gross per 24 hour   Intake              350 ml   Output              680 ml   Net             -330 ml       Invasive Devices     Peripheral Intravenous Line            Peripheral IV 18 Right Arm 3 days          Drain            Urethral Catheter Latex 18 Fr  2 days                Physical Exam: BP (!) 92/42 (BP Location: Left arm)   Pulse (!) 46   Temp (!) 96 8 °F (36 °C) (Tympanic)   Resp 16   Ht 5' 7" (1 702 m)   Wt 62 2 kg (137 lb 3 2 oz)   SpO2 99%   BMI 21 49 kg/m²     General Appearance:    Alert, cooperative, no distress, appears stated age   Head:    Normocephalic, without obvious abnormality, atraumatic   Eyes:    PERRL, conjunctiva/corneas clear, EOM's intact, fundi     benign, both eyes        Ears:    Normal TM's and external ear canals, both ears   Nose:   Nares normal, septum midline, mucosa normal, no drainage    or sinus tenderness   Throat:   Lips, mucosa, and tongue normal; teeth and gums normal   Neck:   Supple, symmetrical, trachea midline, no adenopathy;        thyroid:  No enlargement/tenderness/nodules; no carotid    bruit or JVD Back:     Symmetric, no curvature, ROM normal, no CVA tenderness   Lungs:     Clear to auscultation bilaterally, respirations unlabored   Chest wall:    No tenderness or deformity   Heart:    Regular rate and rhythm, S1 and S2 normal, no murmur, rub   or gallop   Abdomen:     Soft, non-tender, bowel sounds active all four quadrants,     no masses, no organomegaly   Genitalia:    Normal male without lesion, discharge or tenderness   Rectal:    Normal tone, normal prostate, no masses or tenderness;    guaiac negative stool   Extremities:   Extremities normal, atraumatic, no cyanosis or edema   Pulses:   2+ and symmetric all extremities   Skin:   Skin color, texture, turgor normal, no rashes or lesions   Lymph nodes:   Cervical, supraclavicular, and axillary nodes normal   Neurologic:   CNII-XII intact  Normal strength, sensation and reflexes       throughout     Ortho Exam:  Right lower extremity is neurovascularly intact  Toes are pink and mobile  Compartments are soft  There is a negative [place]  Incision is clean and dry  Swelling is dramatically decreased  Range of motion is improved passively  Lab, Imaging and other studies: I have personally reviewed pertinent lab results

## 2018-07-20 NOTE — ASSESSMENT & PLAN NOTE
· Patient is more awake today  · Wife also reports he is nonverbal at baseline    · Continue supportive care

## 2018-07-20 NOTE — ASSESSMENT & PLAN NOTE
· Status post nail insertion of right femur post-op day #2  · Continue with Pain management - Tylenol for mild pain, Norco for moderate pain, IV morphine for severe pain  · PT and OT evaluation  · Patient is not a candidate for ARU  Will be discharged to the [facility] when medically cleared likely Monday

## 2018-07-20 NOTE — PLAN OF CARE
Problem: PHYSICAL THERAPY ADULT  Goal: Performs mobility at highest level of function for planned discharge setting  See evaluation for individualized goals  Treatment/Interventions: ADL retraining, Functional transfer training, LE strengthening/ROM, Therapeutic exercise, Endurance training, Cognitive reorientation, Patient/family training, Equipment eval/education, Bed mobility, Gait training, Compensatory technique education, OT, ST, Spoke to case management  Flaquito Rodney, PT            See flowsheet documentation for full assessment, interventions and recommendations  Outcome: Not Progressing  Prognosis: Guarded  Problem List: Decreased strength, Decreased range of motion, Decreased endurance, Impaired balance, Decreased mobility, Decreased coordination, Decreased cognition, Decreased safety awareness, Orthopedic restrictions, Pain  Assessment: Upon arrival, patient resting supine in bed, open mouth breathing with garza catheter in place and proper bedrails present  Marco Antonio appeared somnolent throughout tx session, intermittent  resistance to PROM therapeutic exercises  I performed PROM BLE's, 20 each supine with occasional grimace upon RLE PROM  Upon conclusion, patient was resting in bed with CNA present  At this time, Raffy cognitive status continues to limit participation in interventions  Skilled inpatient PT will continue to be provided to decrease risk of skin breakdown and contractures, continued monitoring of functional progress  Recommendation: Long-term skilled PT, Long-term skilled nursing home placement     PT - OK to Discharge: No    See flowsheet documentation for full assessment

## 2018-07-20 NOTE — NURSING NOTE
Report given by Jolynn Maurice RN  Was notified that pt penis and scrotum were severely bruised and purple in color  Pt was not like that prior to surgery on Wednesday nor was pt like that when returned from OR at 1900 on Wednesday night  NP and charge nurse informed of patient status

## 2018-07-20 NOTE — NURSING NOTE
2nd unit of PRBC finished at 2036 as per protocol with out difficulties  Pt tolerated well  Vitals stable  Pt does not show any type of S/S of reaction and fluid overload  H&H results of 9 3/26 done 1 hr after transfusion  Call bell with in reach  Will continue to monitor

## 2018-07-20 NOTE — NURSING NOTE
Merino catheter irrigated as per Dr Lynn Poag request  Merino irrigated with 60mL sterile water  Irrigation retuurned 60mL of pink tinted fluid  Pt tolerated well

## 2018-07-20 NOTE — ASSESSMENT & PLAN NOTE
· Nursing reports that patient has not been eating after surgery  Today is much more awake and was eating  · Will continue with IVF for now

## 2018-07-20 NOTE — ASSESSMENT & PLAN NOTE
· Secondary to prior history of stroke  · Speech evaluation is in place  Will continue with aspiration precaution and modify diet

## 2018-07-20 NOTE — ASSESSMENT & PLAN NOTE
· Secondary to surgery  · Patient will be receiving 2 units of packed red blood cells 7/19; 2 more units PRBC today  · Follow-up with hemoglobin closely

## 2018-07-20 NOTE — PROGRESS NOTES
Progress Note - Nephrology   Martínez Cage 82 y o  male MRN: [id number]  Unit/Bed#: -01 Encounter: [id number]    A/P:  1  Acute kidney injury : resolving  2  Hyponatremia: resolved  3  Fall with fractured right femur: s/p fixation  4  Acute blood loss anemia: being followed        Follow up reason for today's visit: MIRIAM    Closed displaced intertrochanteric fracture of right femur [facility])    Patient Active Problem List   Diagnosis    Closed [facility] fracture ([facility] )    Fall    Acquired hypothyroidism    Dyslipidemia    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 behavioral disturbance    Hyponatremia    Leukocytosis    Closed displaced intertrochanteric fracture of right femur ([facility] )    MIRIAM (acute kidney injury) ([facility] )    Acute blood loss anemia    Debility         Subjective:   Unable to assess due to mental status    Objective:     Vitals: Blood pressure (!) 92/42, pulse (!) 46, temperature (!) 96 8 °F (36 °C), temperature source Tympanic, resp  rate 16, height 5' 7" (1 702 m), weight 62 2 kg (137 lb 3 2 oz), SpO2 99 %  ,Body mass index is 21 49 kg/m²  Weight (last 2 days)     None            Intake/Output Summary (Last 24 hours) at 07/20/18 0859  Last data filed at 07/20/18 0601   Gross per 24 hour   Intake              350 ml   Output              680 ml   Net             -330 ml     I/O last 3 completed shifts: In: 350 [Blood:350]  Out: 1630 [Urine:1630]    Urethral Catheter Latex 18 Fr   (Active)   Site Assessment Clean;Skin intact 7/19/2018  2:01 AM   Collection Container Standard drainage bag 7/19/2018  2:01 AM   Securement Method Leg strap 7/19/2018  2:01 AM   Output (mL) 400 mL 7/19/2018  6:15 AM       Physical Exam: BP (!) 92/42 (BP Location: Left arm)   Pulse (!) 46   Temp (!) 96 8 °F (36 °C) (Tympanic)   Resp 16   Ht 5' 7" (1 702 m)   Wt 62 2 kg (137 lb 3 2 oz)   SpO2 99%   BMI 21 49 kg/m²     General Appearance:    Unresponsive to verbal questioning no distress, appears stated age Head:    Normocephalic, without obvious abnormality, atraumatic   Eyes:    Conjunctiva/corneas clear   Ears:    Normal external ears   Nose:   Nares normal, septum midline, mucosa normal, no drainage    or sinus tenderness   Throat:   Lips, mucosa, and tongue normal; teeth and gums normal   Neck:   Supple, symmetrical, trachea midline, no adenopathy;        thyroid:  No enlargement/tenderness/nodules; no carotid    bruit or JVD   Back:     Symmetric, no curvature, ROM normal, no CVA tenderness   Lungs:     Clear to auscultation bilaterally, respirations unlabored   Chest wall:    No tenderness or deformity   Heart:    Regular rate and rhythm, S1 and S2 normal, no murmur, rub   or gallop   Abdomen:     Soft, non-tender, bowel sounds active   Extremities:   Extremities normal, atraumatic, no cyanosis or edema   Skin:   Skin color, texture, turgor normal, no rashes or lesions   Lymph nodes:   Cervical normal   Neurologic:   CNII-XII not assessed due to mental status            Lab, Imaging and other studies: I have personally reviewed pertinent labs  CBC: Lab Results   Component Value Date    HGB 8 4 (L) 07/20/2018    HCT 23 4 (L) 07/20/2018     CMP: Lab Results   Component Value Date     07/19/2018    K 4 0 07/19/2018     07/19/2018    CO2 26 07/19/2018    ANIONGAP 4 07/19/2018    BUN 24 07/19/2018    CREATININE 1 18 07/19/2018    GLUCOSE 93 07/19/2018    CALCIUM 8 8 07/19/2018    AST 34 07/19/2018    ALT 12 07/19/2018    ALKPHOS 70 07/19/2018    PROT 5 2 (L) 07/19/2018    BILITOT 2 30 (H) 07/19/2018    EGFR 58 07/19/2018           Results from last 7 days  Lab Units 07/19/18  2206 07/19/18  0501 07/18/18  0519  07/16/18  1458   SODIUM mmol/L 135 135 135  < > 130*   POTASSIUM mmol/L 4 0 3 8 4 8  < > 4 6   CHLORIDE mmol/L 105 105 104  < > 95*   CO2 mmol/L 26 26 23  < > 28   BUN mg/dL 24 25 32*  < > 20   CREATININE mg/dL 1 18 1 26 1 66*  < > 1 14   CALCIUM mg/dL 8 8 8 6 8 8  < > 9 5   TOTAL PROTEIN g/dL 5 2*  --   --   --  6 6   BILIRUBIN TOTAL mg/dL 2 30*  --   --   --  0 50   ALK PHOS U/L 70  --   --   --  122   ALT U/L 12  --   --   --  18   AST U/L 34  --   --   --  20   GLUCOSE RANDOM mg/dL 93 102* 116*  < > 176*   < > = values in this interval not displayed  Phosphorus: No results found for: PHOS  Magnesium: No results found for: MG  Urinalysis: No results found for: Marthann End, SPECGRAV, PHUR, LEUKOCYTESUR, NITRITE, PROTEINUA, GLUCOSEU, KETONESU, BILIRUBINUR, BLOODU  Ionized Calcium: No results found for: CAION  Coagulation: No results found for: PT, INR, APTT  Troponin: No results found for: TROPONINI  ABG: No results found for: PHART, XOW6DLU, PO2ART, UEL9CRH, P5OBIUZG, BEART, SOURCE  Radiology review:     IMAGING  Procedure: Xr Hip/pelv 2-3 Vws Right If Performed    Result Date: 7/19/2018  Narrative: INDICATION:  Right hip pinning  TECHNIQUE:  6 fluoroscopic spot images were obtained  Fluoroscopy time was 101 8 seconds  COMPARISON:  None Available  FINDINGS:  Digital intraoperative spot radiographs were obtained during the compression screw fixation of the right hip  Intraosseous positioning and anatomic alignment demonstrated  Impression: Compression screw fixation of the right hip  Signed by Douglas Nobles MD    Procedure: Us Retroperitoneal Complete    Result Date: 7/18/2018  Narrative: INDICATION:  Acute kidney injury TECHNIQUE:  Ultrasound of the kidneys  COMPARISON:  CT C/A/P 07/16/2018 FINDINGS: Technically difficult exam   Patient is combative, in traction, and unable to change position  The right kidney measures 8 3 cm in length  There is incomplete imaging of the lower pole due to overlying bowel gas  Renal cortical echotexture is normal   There is no hydronephrosis  There are no stones  The left kidney is not visualized  Gallstones are incidentally visualized  Impression: No right-sided hydronephrosis  The left kidney is not visualized  Cholelithiasis   Signed by Delfina Betancur      Current Facility-Administered Medications   Medication Dose Route Frequency    acetaminophen (TYLENOL) tablet 650 mg  650 mg Oral Q6H PRN    bisacodyl (DULCOLAX) rectal suppository 10 mg  10 mg Rectal Q24H    dextrose 5 % and sodium chloride 0 45 % infusion  75 mL/hr Intravenous Continuous    docusate sodium (COLACE) capsule 100 mg  100 mg Oral BID    ezetimibe (ZETIA) tablet 10 mg  10 mg Oral Daily    fondaparinux (ARIXTRA) subcutaneous injection 2 5 mg  2 5 mg Subcutaneous Daily    furosemide (LASIX) injection 20 mg  20 mg Intravenous Once    HYDROcodone-acetaminophen (NORCO) 5-325 mg per tablet 1 tablet  1 tablet Oral Q6H PRN    levothyroxine tablet 88 mcg  88 mcg Oral Early Morning    morphine (PF) 4 mg/mL injection 3 mg  3 mg Intravenous Q4H PRN    multivitamin-minerals (CENTRUM) tablet 1 tablet  1 tablet Oral Daily    ondansetron (ZOFRAN) injection 4 mg  4 mg Intravenous Q6H PRN     Medications Discontinued During This Encounter   Medication Reason    enoxaparin (LOVENOX) subcutaneous injection 40 mg     morphine injection 2 mg     bupivacaine (MARCAINE) 0 5 % injection Patient Discharge    povidone-iodine (BETADINE) 10 % ointment Patient Discharge    bacitracin 50,000 Units in sodium chloride 0 9 % 1,000 mL irrigation bag Patient Discharge    morphine injection 1 mg     sodium chloride 0 9 % infusion     fentaNYL (SUBLIMAZE) injection 12 5 mcg Patient Transfer    lactated ringers infusion        Roma Cisneros MD

## 2018-07-20 NOTE — CASE MANAGEMENT
Continued Stay Review    Date/POD#:  POD # 2     Procedure(s) (LRB):  INSERTION NAIL IM FEMUR ANTEGRADE (TROCHANTERIC) (Right) using the [employer] TFN nail system with an 11 mm x 130 degree short TFN nail, a 100 mm spiral blade, a 38 mm distal locking screw    Vital Signs: /68   Pulse 60   Temp (!) 97 4 °F (36 3 °C) (Tympanic)   Resp 18   Ht 5' 7" (1 702 m)   Wt 62 2 kg (137 lb 3 2 oz)   SpO2 99%   BMI 21 49 kg/m²     Medication:   Scheduled Meds:   Current Facility-Administered Medications:  acetaminophen 650 mg Oral Q6H PRN   bisacodyl 10 mg Rectal Q24H   dextrose 5 % and sodium chloride 0 45 % 100 mL/hr Intravenous Continuous   docusate sodium 100 mg Oral BID   ezetimibe 10 mg Oral Daily   fondaparinux 2 5 mg Subcutaneous Daily   furosemide 20 mg Intravenous Once   HYDROcodone-acetaminophen 1 tablet Oral Q6H PRN   levothyroxine 88 mcg Oral Early Morning   morphine injection 3 mg Intravenous Q4H PRN   multivitamin-minerals 1 tablet Oral Daily   ondansetron 4 mg Intravenous Q6H PRN     Abnormal Labs/Diagnostic Results: Na 135 (from 130) , H&H 8  4   Age/Sex: 82 y o  male     Assessment/Plan: Fall with fractured right femur: s/p fixation  MIRIAM, Hyponatremia     Weight bearin% wb right lower extremity    Discharge Plan: Possible discharge  to [place]

## 2018-07-20 NOTE — PHYSICAL THERAPY NOTE
Physical Therapy Treatment Encounter Note    Patient's Name: Tami Ribeiro    Admitting Diagnosis  Closed displaced intertrochanteric fracture of right femur, initial encounter [facility]) [S72 141A]    Problem List  Patient Active Problem List   Diagnosis    Closed [facility] fracture [facility]    Fall    Acquired hypothyroidism    Dyslipidemia    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 behavioral disturbance    Hyponatremia    Leukocytosis    [facility]    MIRIAM (acute kidney injury) ([facility] )    Acute blood loss anemia    Debility       Past Medical History  Past Medical History:   Diagnosis Date    [facility]     Cardiac disease     [facility]ce[facility]     Disease of thyroid gland     hypothyroid    Sacral fracture ([facility] )        Past Surgical History  History reviewed  No pertinent surgical history       07/20/18 0921   Pain Assessment   Pain Assessment Unable to assess   Restrictions/Precautions   Weight Bearing Precautions Per Order Yes   RUE Weight Bearing Per Order FWB   LUE Weight Bearing Per Order FWB   RLE Weight Bearing Per Order PWB  (50% WB RLE)   LLE Weight Bearing Per Order FWB   Cognition   Overall Cognitive Status (patient somnolent, decrease eye contact with verbal cues)   Arousal/Participation Poorly responsive;Lethargic   Attention Other (Comment)  (could not direct with verbal and tactile cues 100% of tx )   Orientation Level Disoriented X4   Memory Unable to assess   Following Commands Unable to follow one step commands   Subjective   Subjective patient is non-verbal, did not make facial expressions throughout tx session   Endurance Deficit   Endurance Deficit Yes   Activity Tolerance   Activity Tolerance Patient limited by pain;Treatment limited secondary to medical complications (Comment)   Exercises   Heelslides Supine;PROM;20 reps;Bilateral   Hip Flexion Supine;PROM;20 reps;Bilateral   Hip Abduction Supine;20 reps;PROM; Bilateral   Hip Adduction Supine;PROM;20 reps;Bilateral   Knee PROM Flexion Supine;20 reps;Bilateral   Knee PROM Extension Supine;20 reps;Bilateral   Knee Flexion Stretch Supine;10 reps;PROM; Bilateral   Knee Extension Stretch Supine;10 reps;PROM; Bilateral   Ankle Pumps Supine;Bilateral;PROM;20 reps   Heel Cord Stretch Supine;10 reps;Bilateral;PROM   Assessment   Prognosis Guarded   Problem List Decreased strength;Decreased range of motion;Decreased endurance; Impaired balance;Decreased mobility; Decreased coordination;Decreased cognition;Decreased safety awareness;Orthopedic restrictions;Pain   Assessment Upon arrival, patient resting supine in bed, open mouth breathing with garza catheter in place and proper bedrails present  [facility] appeared somnolent throughout tx session, intermittent  resistance to PROM therapeutic exercises  I performed PROM BLE's, 20 each supine with occasional grimace upon RLE PROM  Upon conclusion, patient was resting in bed with CNA present  At this time, Raffy cognitive status continues to limit participation in interventions  Skilled inpatient PT will continue to be provided to decrease risk of skin breakdown and contractures, continued monitoring of functional progress  Goals   Patient Goals patient could not provide, unknown;family was not present for session   STG Expiration Date 07/26/18   LTG Expiration Date 08/02/18   Plan   Treatment/Interventions ADL retraining;Functional transfer training;LE strengthening/ROM; Therapeutic exercise; Endurance training;Cognitive reorientation;Patient/family training;Equipment eval/education; Bed mobility;Gait training; Compensatory technique education;Spoke to nursing;OT;ST   Progress No functional improvements   PT Frequency Other (Comment)  (5-7x/week)   Recommendation   Recommendation Long-term skilled PT;Long-term skilled nursing home placement   PT - OK to Discharge No     Marisela Distel, PT

## 2018-07-20 NOTE — SOCIAL WORK
Patient discussed in discharge planning meeting and with hospitalist Bimal Erazo  Patient will not be medically cleared until at least Monday  Here through the weekend  Discussed same with patients wife  Pt accepted to both [facility] and [facility]  Wife prefers admit to [facility]  Abbie at [facility] made aware of same and that pt will be here through the weekend  CM will continue to follow/

## 2018-07-20 NOTE — PROGRESS NOTES
Progress Note - Hawkins Compton 1935, 82 y o  male MRN: [id number]    Unit/Bed#: -01 Encounter: [id number]    Primary Care Provider: Kenyetta Duarte MD   Date and time admitted to hospital: 7/16/2018  7:28 PM        * Closed displaced intertrochanteric fracture of right femur [facility])   Assessment & Plan    · Status post nail insertion of right femur post-op day #2  · Continue with Pain management - Tylenol for mild pain, Norco for moderate pain, IV morphine for severe pain  · PT and OT evaluation  · Patient is not a candidate for ARU  Will be discharged to the [place] when medically cleared likely Monday  Acute blood loss anemia   Assessment & Plan    · Secondary to surgery  · Patient will be receiving 2 units of packed red blood cells 7/19; 2 more units PRBC today  · Follow-up with hemoglobin closely  MIRIAM (acute kidney injury) ([facility] [address] )   Assessment & Plan    · Likely secondary to volume depletion and anemia  Renal input is appreciated  · US renal does not show any hydronephrosis on right kidney  Left kidney is not visualized  · Creatinine is back to baseline today  · Will continue to monitor          [address] [place] [address]    · Patient is status post mechanical fall prior to admission  · He has a resultant hip fracture as outlined above  · CT scan of the head was within normal limits  · No further workup  · Fall precaution  · PT/OT         Debility   Assessment & Plan    · Nursing reports that patient has not been eating after surgery  Today is much more awake and was eating  · Will continue with IVF for now  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 behavioral disturbance   Assessment & Plan    · Patient is more awake today  · Wife also reports he is nonverbal at baseline  · Continue supportive care         Hyponatremia   Assessment & Plan    · Suspected this to be secondary to volume depletion      · This is resolved after IVF  · Follow up with Na in AM Other dysphagia   Assessment & Plan    · Secondary to prior history of stroke  · Speech evaluation is in place  Will continue with aspiration precaution and modify diet  Leukocytosis   Assessment & Plan    · Most likely reactive  This is resolved  · Will monitor with daily CBCs  · Afebrile, UA is clean  Dyslipidemia   Assessment & Plan    · Continue Zetia        Acquired hypothyroidism   Assessment & Plan    · Continue Synthroid            VTE Pharmacologic Prophylaxis:   Pharmacologic: Pharmacologic VTE Prophylaxis contraindicated due to  scrotal hematoma   Mechanical VTE Prophylaxis in Place: Yes    Patient Centered Rounds: I have performed bedside rounds with nursing staff today  Discussions with Specialists or Other Care Team Provider:  Nursing     Education and Discussions with Family / Patient:  Wife     Time Spent for Care: 20 minutes  More than 50% of total time spent on counseling and coordination of care as described above  Current Length of Stay: 4 day(s)    Current Patient Status: Inpatient   Certification Statement: The patient will continue to require additional inpatient hospital stay due to blood transfusion     Discharge Plan:  Pending hospital course    Code Status: Level 3 - DNAR and DNI      Subjective:    non-verbal     Objective:     Vitals:   Temp (24hrs), Av 3 °F (36 3 °C), Min:96 8 °F (36 °C), Max:97 9 °F (36 6 °C)    HR:  [46-98] 98  Resp:  [16-20] 18  BP: ()/(42-69) 119/69  SpO2:  [98 %-99 %] 99 %  Body mass index is 21 49 kg/m²  Input and Output Summary (last 24 hours): Intake/Output Summary (Last 24 hours) at 18 1804  Last data filed at 18 1748   Gross per 24 hour   Intake             2792 ml   Output              780 ml   Net             2012 ml       Physical Exam:     Physical Exam   Constitutional: He appears well-developed and well-nourished  No distress  HENT:   Head: Normocephalic and atraumatic     Dry mucosa   Eyes: Conjunctivae and EOM are normal  Pupils are equal, round, and reactive to light  Neck: Normal range of motion  Neck supple  No thyromegaly present  Cardiovascular: Normal rate, regular rhythm, normal heart sounds and intact distal pulses  Pulmonary/Chest: Effort normal  No respiratory distress  He has no wheezes  Decreased      Abdominal: Soft  Bowel sounds are normal  He exhibits no distension  There is no tenderness  Musculoskeletal: Normal range of motion  He exhibits edema (RLE)  He exhibits no deformity  Neurological: He is alert  He has normal reflexes  Skin: Skin is warm and dry  No erythema  Right hip incision is dry and intact     Vitals reviewed  Additional Data:     Labs:      Results from last 7 days  Lab Units 07/20/18  0506  07/19/18  0501  07/17/18  0624   WBC Thousand/uL  --   --  8 70  < > 12 10*   HEMOGLOBIN g/dL 8 4*  < > 6 6*  < > 11 6*   HEMATOCRIT % 23 4*  < > 18 4*  < > 34 6*   PLATELETS Thousands/uL  --   --  129*  < > 229   NEUTROS PCT %  --   --   --   --  84*   LYMPHS PCT %  --   --   --   --  6*   MONOS PCT %  --   --   --   --  10   EOS PCT %  --   --   --   --  0   < > = values in this interval not displayed  Results from last 7 days  Lab Units 07/19/18  2206   SODIUM mmol/L 135   POTASSIUM mmol/L 4 0   CHLORIDE mmol/L 105   CO2 mmol/L 26   BUN mg/dL 24   CREATININE mg/dL 1 18   CALCIUM mg/dL 8 8   TOTAL PROTEIN g/dL 5 2*   BILIRUBIN TOTAL mg/dL 2 30*   ALK PHOS U/L 70   ALT U/L 12   AST U/L 34   GLUCOSE RANDOM mg/dL 93       Results from last 7 days  Lab Units 07/17/18  0624   INR  1 05                 * I Have Reviewed All Lab Data Listed Above  * Additional Pertinent Lab Tests Reviewed:  John[facility] Admission Reviewed        Recent Cultures (last 7 days):       Results from last 7 days  Lab Units 07/17/18  1453   URINE CULTURE  No Growth <1000 cfu/mL       Last 24 Hours Medication List:     Current Facility-Administered Medications:  acetaminophen 650 mg Oral Q6H PRN Kimberley Eisenberg MD    bisacodyl 10 mg Rectal Q24H Kimberley Eisenberg MD    dextrose 5 % and sodium chloride 0 45 % 100 mL/hr Intravenous Continuous MESSI Kunz Last Rate: Stopped (07/20/18 0906)   docusate sodium 100 mg Oral BID Kimberley Eisenberg MD    ezetimibe 10 mg Oral Daily Kimberley Eisenberg MD    fondaparinux 2 5 mg Subcutaneous Daily Flavio Norwood DO    furosemide 20 mg Intravenous Once MESSI Kunz    HYDROcodone-acetaminophen 1 tablet Oral Q6H PRN Kimberley Eisenberg MD    levothyroxine 88 mcg Oral Early Morning Kimberley Eisenberg MD    morphine injection 3 mg Intravenous Q4H PRN Flavio Norwood DO    multivitamin-minerals 1 tablet Oral Daily Kimberley Eisenberg MD    ondansetron 4 mg Intravenous Q6H PRN Kimberley Eisenberg MD         Today, Patient Was Seen By: MESSI Kunz    ** Please Note: Dictation voice to text software may have been used in the creation of this document   **

## 2018-07-21 ENCOUNTER — APPOINTMENT (INPATIENT)
Dept: ULTRASOUND IMAGING | Facility: HOSPITAL | Age: 83
DRG: 481 | End: 2018-07-21
Payer: MEDICARE

## 2018-07-21 LAB
ALBUMIN SERPL BCP-MCNC: 2.9 G/DL (ref 3.5–5.7)
ALP SERPL-CCNC: 126 U/L (ref 55–165)
ALT SERPL W P-5'-P-CCNC: 41 U/L (ref 7–52)
ANION GAP SERPL CALCULATED.3IONS-SCNC: 5 MMOL/L (ref 4–13)
AST SERPL W P-5'-P-CCNC: 74 U/L (ref 13–39)
BILIRUB SERPL-MCNC: 3.2 MG/DL (ref 0.2–1)
BUN SERPL-MCNC: 19 MG/DL (ref 7–25)
CALCIUM SERPL-MCNC: 8.4 MG/DL (ref 8.6–10.5)
CHLORIDE SERPL-SCNC: 101 MMOL/L (ref 98–107)
CO2 SERPL-SCNC: 27 MMOL/L (ref 21–31)
CREAT SERPL-MCNC: 1.16 MG/DL (ref 0.7–1.3)
ERYTHROCYTE [DISTWIDTH] IN BLOOD BY AUTOMATED COUNT: 14.5 % (ref 11.5–14.5)
GFR SERPL CREATININE-BSD FRML MDRD: 59 ML/MIN/1.73SQ M
GLUCOSE SERPL-MCNC: 112 MG/DL (ref 65–99)
HCT VFR BLD AUTO: 36.1 % (ref 36.5–49.3)
HGB BLD-MCNC: 12.8 G/DL (ref 14–18)
MCH RBC QN AUTO: 32.3 PG (ref 26–34)
MCHC RBC AUTO-ENTMCNC: 35.3 G/DL (ref 31–37)
MCV RBC AUTO: 91 FL (ref 81–99)
PLATELET # BLD AUTO: 151 THOUSANDS/UL (ref 149–390)
PMV BLD AUTO: 7.1 FL (ref 8.6–11.7)
POTASSIUM SERPL-SCNC: 3.3 MMOL/L (ref 3.5–5.5)
PROT SERPL-MCNC: 5.1 G/DL (ref 6.4–8.9)
RBC # BLD AUTO: 3.95 MILLION/UL (ref 4.3–5.9)
SODIUM SERPL-SCNC: 133 MMOL/L (ref 134–143)
WBC # BLD AUTO: 9.3 THOUSAND/UL (ref 4.8–10.8)

## 2018-07-21 PROCEDURE — 76705 ECHO EXAM OF ABDOMEN: CPT

## 2018-07-21 PROCEDURE — 80053 COMPREHEN METABOLIC PANEL: CPT | Performed by: NURSE PRACTITIONER

## 2018-07-21 PROCEDURE — 99232 SBSQ HOSP IP/OBS MODERATE 35: CPT | Performed by: INTERNAL MEDICINE

## 2018-07-21 PROCEDURE — 85027 COMPLETE CBC AUTOMATED: CPT | Performed by: NURSE PRACTITIONER

## 2018-07-21 PROCEDURE — 94760 N-INVAS EAR/PLS OXIMETRY 1: CPT

## 2018-07-21 RX ORDER — DEXTROSE, SODIUM CHLORIDE, AND POTASSIUM CHLORIDE 5; .9; .15 G/100ML; G/100ML; G/100ML
50 INJECTION INTRAVENOUS CONTINUOUS
Status: DISCONTINUED | OUTPATIENT
Start: 2018-07-21 | End: 2018-07-23

## 2018-07-21 RX ADMIN — BISACODYL 10 MG: 10 SUPPOSITORY RECTAL at 21:10

## 2018-07-21 RX ADMIN — DEXTROSE AND SODIUM CHLORIDE 100 ML/HR: 5; 450 INJECTION, SOLUTION INTRAVENOUS at 01:57

## 2018-07-21 RX ADMIN — FONDAPARINUX SODIUM 2.5 MG: 2.5 INJECTION, SOLUTION SUBCUTANEOUS at 08:35

## 2018-07-21 RX ADMIN — POTASSIUM CHLORIDE, DEXTROSE MONOHYDRATE AND SODIUM CHLORIDE 50 ML/HR: 150; 5; 900 INJECTION, SOLUTION INTRAVENOUS at 13:18

## 2018-07-21 NOTE — NURSING NOTE
Patient sleeping comfortably in bed  Patient has had no acute changes in baseline status noted from previous assessment  DR Gladis Escalante was here to see PT  order to keep Urinary catheter is in place and acknowledged  Orders to keep scrotum area elevated  Vital WNL with the exception of BP  Hospitalist awared  IV patent and intact  Bed in lowest position, rails x2  Call bell within reach  will continue to monitor

## 2018-07-21 NOTE — ASSESSMENT & PLAN NOTE
· Patient appears to be at his baseline  · Wife notes he is nonverbal at baseline    · Continue supportive care

## 2018-07-21 NOTE — CONSULTS
Consultation - Urology   Dottie Fernandez 82 y o  male MRN: [id number]  Unit/Bed#: -01 Encounter: [id number]      Assessment/Plan      Assessment:  Foreskin and scrotal ecchymosis and mild edema after right hip repair  Plan:  Elevate scrotum  Observed to ensure resolution  Continue Merino catheter until resolved  History of Present Illness   Attending: Lora Bland MD  Reason for Consult / Principal Problem:   Scrotal ecchymosis  HPI: Dottie Fernandez is a 82y o  year old male who presents with right hip fracture after a fall in the nursing home  He has advanced dementia and is unable to give any history  He was noted to have significant ecchymosis of the scrotum and Urology was consulted  He also has a Merino catheter in place and Merino management is requested  Consults    Review of Systems   Genitourinary: Positive for penile swelling and scrotal swelling  Historical Information   Past Medical History:   Diagnosis Date    [facility] failure [facility]     Cardiac disease     CVA (cerebral vascular accident) [facility]     Disease of thyroid gland     hypothyroid    Sacral fracture ([facility] )      Past Surgical History:   Procedure Laterality Date    ID OPEN RX FEMUR FX+INTRAMED LYNDA Right 7/18/2018    Procedure: INSERTION NAIL IM FEMUR ANTEGRADE right(TROCHANTERIC); Surgeon: Vinh Vasquez DO;  Location: [facility] MAIN OR;  Service: Orthopedics     Social History   History   Alcohol Use No     History   Drug Use No     History   Smoking Status    Unknown If Ever Smoked   Smokeless Tobacco    Never Used     Family History: non-contributory    Meds/Allergies   all current active meds have been reviewed  Allergies   Allergen Reactions    Corticosteroids     Prednisone Other (See Comments)     Dizziness    Sulfa Antibiotics Fever       Objective   Vitals: Blood pressure 119/69, pulse 98, temperature 97 9 °F (36 6 °C), temperature source Tympanic, resp   rate 18, height 5' 7" (1 702 m), weight 62 2 kg (137 lb 3 2 oz), SpO2 99 %  I/O last 24 hours: In: 0161 [I V :2092; Blood:700]  Out: 780 [Urine:780]    Invasive Devices     Peripheral Intravenous Line            Peripheral IV 07/16/18 Right Arm 4 days          Drain            Urethral Catheter Latex 18 Fr  3 days                Physical Exam   Genitourinary:   Genitourinary Comments: Ecchymosis of the foreskin and scrotum right side greater than left  The color is almost black  There is mild edema of the skin of the foreskin and scrotum  The testicles and spermatic cords are easily palpable with no swelling or tenderness  No intrascrotal fluid collection  The foreskin retracts easily and the glans penis is normal with no sign of ecchymosis  Merino catheter is intact draining clear urine well  There is a wide streak of ecchymosis extending down the right inner thigh  Lab Results:   CBC:   Lab Results   Component Value Date    HGB 11 9 (L) 07/20/2018    HCT 33 6 (L) 07/20/2018     CMP:   Lab Results   Component Value Date     07/19/2018     07/19/2018    CO2 26 07/19/2018    ANIONGAP 4 07/19/2018    BUN 24 07/19/2018    CREATININE 1 18 07/19/2018    GLUCOSE 93 07/19/2018    CALCIUM 8 8 07/19/2018    AST 34 07/19/2018    ALT 12 07/19/2018    ALKPHOS 70 07/19/2018    PROT 5 2 (L) 07/19/2018    BILITOT 2 30 (H) 07/19/2018    EGFR 58 07/19/2018     Urinalysis: No results found for: Colby Butts, SPECGRAV, PHUR, LEUKOCYTESUR, NITRITE, PROTEINUA, GLUCOSEU, KETONESU, BILIRUBINUR, BLOODU  Urine Culture: No results found for: URINECX  Imaging Studies: I have personally reviewed pertinent reports  EKG, Pathology, and Other Studies: I have personally reviewed pertinent reports  VTE Prophylaxis: Sequential compression device Leidy Casa)     Code Status: Level 3 - DNAR and DNI  Advance Directive and Living Will:      Power of :    POLST:      Counseling / Coordination of Care  Total floor / unit time spent today 30 minutes   Greater than 50% of total time was spent with the patient and / or family counseling and / or coordination of care   A description of the counseling / coordination of care:  Discussed the case with the nursing staff and hospitalist

## 2018-07-21 NOTE — ASSESSMENT & PLAN NOTE
· Acute blood loss anemia due to surgery  · Hemoglobin is 12 today, appropriately improved after transfusion  · Trend hemoglobin

## 2018-07-21 NOTE — ASSESSMENT & PLAN NOTE
· Status post nail insertion of right femur post-op day #2  · Continue with Pain control  · PT/OT  · Discharged to [address] likely on Monday

## 2018-07-21 NOTE — PROGRESS NOTES
Progress Note - Tami Ribeiro 1935, 82 y o  male MRN: [id number]    Unit/Bed#: -01 Encounter: [id number]    Primary Care Provider: Jose Eduardo Powell MD   Date and time admitted to hospital: 7/16/2018  7:28 PM        Scrotal hematoma   Assessment & Plan      · Appreciate Urology input  · Elevate scrotum          Other dysphagia   Assessment & Plan    · Secondary to prior history of stroke  · Speech evaluation is in place  Will continue with aspiration precaution and modify diet  Debility   Assessment & Plan    · Awaiting placement          Acute blood loss anemia   Assessment & Plan    · Acute blood loss anemia due to surgery  · Hemoglobin is 12 today, appropriately improved after transfusion  · Trend hemoglobin        MIRAIM (acute kidney injury) ([facility] [address] )   Assessment & Plan    · Resolved  · Check labs tomorrow morning          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 behavioral disturbance   Assessment & Plan    · Patient appears to be at his baseline  · Wife notes he is nonverbal at baseline  · Continue supportive care         Acquired hypothyroidism   Assessment & Plan    · Continue Synthroid        * Closed displaced intertrochanteric fracture of right femur (HCC)   Assessment & Plan    · Status post nail insertion of right femur post-op day #2  · Continue with Pain control  · PT/OT  · Discharged to [address] [facility] likely on Monday  VTE Pharmacologic Prophylaxis:   Pharmacologic: Fondaparinux (Arixtra)  Mechanical VTE Prophylaxis in Place: Yes    Patient Centered Rounds: I have performed bedside rounds with nursing staff today  Discussions with Specialists or Other Care Team Provider: n/a    Education and Discussions with Family / Patient: n/a    Time Spent for Care: 20 minutes  More than 50% of total time spent on counseling and coordination of care as described above      Current Length of Stay: 5 day(s)    Current Patient Status: Inpatient   Certification Statement: The patient will continue to require additional inpatient hospital stay due to anemia, hip fracture    Discharge Plan:  SNF when medically cleared    Code Status: Level 3 - DNAR and DNI      Subjective:   Patient seen examined  No acute events overnight  Patient is nonverbal at baseline, appears comfortable    Objective:     Vitals:   Temp (24hrs), Av 2 °F (36 8 °C), Min:97 2 °F (36 2 °C), Max:100 2 °F (37 9 °C)    HR:  [] 108  Resp:  [18-22] 22  BP: ()/(50-74) 123/74  SpO2:  [94 %-99 %] 94 %  Body mass index is 21 49 kg/m²  Input and Output Summary (last 24 hours): Intake/Output Summary (Last 24 hours) at 18 0757  Last data filed at 18 0524   Gross per 24 hour   Intake             3792 ml   Output             1835 ml   Net             1957 ml       Physical Exam:     Physical Exam   Constitutional: No distress  HENT:   Head: Normocephalic and atraumatic  Dry mucus membranes   Eyes: EOM are normal  Pupils are equal, round, and reactive to light  Neck: Normal range of motion  Neck supple  Cardiovascular: Normal rate and regular rhythm  Pulmonary/Chest: Effort normal    Decreased breath sounds at bases bilaterally   Abdominal: Soft  Bowel sounds are normal    Musculoskeletal: He exhibits edema  Right hip dressing appears clean dry and intact   Neurological:   Nonverbal at baseline   Skin: Skin is warm and dry  He is not diaphoretic  Psychiatric:   Unable to assess   Nursing note and vitals reviewed  Additional Data:     Labs:      Results from last 7 days  Lab Units 18  0541  18  0624   WBC Thousand/uL 9 30  < > 12 10*   HEMOGLOBIN g/dL 12 8*  < > 11 6*   HEMATOCRIT % 36 1*  < > 34 6*   PLATELETS Thousands/uL 151  < > 229   NEUTROS PCT %  --   --  84*   LYMPHS PCT %  --   --  6*   MONOS PCT %  --   --  10   EOS PCT %  --   --  0   < > = values in this interval not displayed      Results from last 7 days  Lab Units 18  2206   SODIUM mmol/L 135   POTASSIUM mmol/L 4 0   CHLORIDE mmol/L 105   CO2 mmol/L 26   BUN mg/dL 24   CREATININE mg/dL 1 18   CALCIUM mg/dL 8 8   TOTAL PROTEIN g/dL 5 2*   BILIRUBIN TOTAL mg/dL 2 30*   ALK PHOS U/L 70   ALT U/L 12   AST U/L 34   GLUCOSE RANDOM mg/dL 93       Results from last 7 days  Lab Units 07/17/18  0624   INR  1 05                 * I Have Reviewed All Lab Data Listed Above  * Additional Pertinent Lab Tests Reviewed: Johnny 66 Admission Reviewed    Imaging:    Imaging Reports Reviewed Today Include: n/a  Imaging Personally Reviewed by Myself Includes:  n/a    Recent Cultures (last 7 days):       Results from last 7 days  Lab Units 07/17/18  1453   URINE CULTURE  No Growth <1000 cfu/mL       Last 24 Hours Medication List:     Current Facility-Administered Medications:  acetaminophen 650 mg Oral Q6H PRN Charlette Castro MD    bisacodyl 10 mg Rectal Q24H Charlette Castro MD    dextrose 5 % and sodium chloride 0 45 % 100 mL/hr Intravenous Continuous MESSI Escalera Last Rate: 100 mL/hr (07/21/18 0157)   docusate sodium 100 mg Oral BID Charlette Castro MD    ezetimibe 10 mg Oral Daily Charlette Castro MD    fondaparinux 2 5 mg Subcutaneous Daily Trevor Browning,     furosemide 20 mg Intravenous Once MESSI Escalera    HYDROcodone-acetaminophen 1 tablet Oral Q6H PRN Charlette Castro MD    levothyroxine 88 mcg Oral Early Morning Charlette Castro MD    morphine injection 3 mg Intravenous Q4H PRN Trevor Browning, DO    multivitamin-minerals 1 tablet Oral Daily Charlette Castro MD    ondansetron 4 mg Intravenous Q6H PRN Charlette Castro MD         Today, Patient Was Seen By: Sweta Mo MD    ** Please Note: Dictation voice to text software may have been used in the creation of this document   **

## 2018-07-21 NOTE — PROGRESS NOTES
Progress Note - Nephrology   Holly Love 82 y o  male MRN: [id number]  Unit/Bed#: -01 Encounter: [id number]    A/P:  1  Acute kidney injury : resolving  2  Hyponatremia: resolved  7/21: serum sodium lower -0 will change IVF for 24 hours  3  Fall with fractured right femur: s/p fixation  4  Acute blood loss anemia: being followed        Follow up reason for today's visit: MIRIAM    Closed displaced intertrochanteric fracture of right femur [facility])    Patient Active Problem List   Diagnosis    Closed right hip fracture ([facility] )    Fall    Acquired hypothyroidism    Dyslipidemia    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 behavioral disturbance    Hyponatremia    Leukocytosis    Closed displaced intertrochanteric fracture of right femur ([facility] )    MIRIAM (acute kidney injury) ([place] [address] )    Acute blood loss anemia    Debility    Other dysphagia    Scrotal hematoma         Subjective:   Unable to assess due to mental status    Objective:     Vitals: Blood pressure 123/74, pulse (!) 108, temperature 99 1 °F (37 3 °C), temperature source Tympanic, resp  rate 22, height 5' 7" (1 702 m), weight 62 2 kg (137 lb 3 2 oz), SpO2 94 %  ,Body mass index is 21 49 kg/m²  Weight (last 2 days)     Date/Time    07/21/18 0703    Comment rows:    OBSERV: Cool cloth on forehead at 07/21/18 0703                Intake/Output Summary (Last 24 hours) at 07/21/18 1156  Last data filed at 07/21/18 0900   Gross per 24 hour   Intake             1454 ml   Output             1835 ml   Net             -381 ml     I/O last 3 completed shifts: In: 9774 [I V :3092; Blood:700]  Out: 2315 [Urine:2315]    Urethral Catheter Latex 18 Fr   (Active)   Site Assessment Clean;Skin intact 7/19/2018  2:01 AM   Collection Container Standard drainage bag 7/19/2018  2:01 AM   Securement Method Leg strap 7/19/2018  2:01 AM   Output (mL) 400 mL 7/19/2018  6:15 AM       Physical Exam: /74 (BP Location: Right arm)   Pulse (!) 108   Temp 99 1 °F (37 3 °C) (Tympanic)   Resp 22   Ht 5' 7" (1 702 m)   Wt 62 2 kg (137 lb 3 2 oz)   SpO2 94%   BMI 21 49 kg/m²     General Appearance:    Unresponsive to verbal questioning no distress, appears stated age  7/21 more alert today   Head:    Normocephalic, without obvious abnormality, atraumatic   Eyes:    Conjunctiva/corneas clear   Ears:    Normal external ears   Nose:   Nares normal, septum midline, mucosa normal, no drainage    or sinus tenderness   Throat:   Lips, mucosa, and tongue normal; teeth and gums normal   Neck:   Supple, symmetrical, trachea midline, no adenopathy;        thyroid:  No enlargement/tenderness/nodules; no carotid    bruit or JVD   Back:     Symmetric, no curvature, ROM normal, no CVA tenderness   Lungs:     Clear to auscultation bilaterally, respirations unlabored   Chest wall:    No tenderness or deformity   Heart:    Regular rate and rhythm, S1 and S2 normal, no murmur, rub   or gallop   Abdomen:     Soft, non-tender, bowel sounds active   Extremities:   Extremities normal, atraumatic, no cyanosis or edema   Skin:   Skin color, texture, turgor normal, no rashes or lesions   Lymph nodes:   Cervical normal   Neurologic:   CNII-XII not assessed due to mental status            Lab, Imaging and other studies: I have personally reviewed pertinent labs    CBC:   Lab Results   Component Value Date    WBC 9 30 07/21/2018    HGB 12 8 (L) 07/21/2018    HCT 36 1 (L) 07/21/2018    MCV 91 07/21/2018     07/21/2018    MCH 32 3 07/21/2018    MCHC 35 3 07/21/2018    RDW 14 5 07/21/2018    MPV 7 1 (L) 07/21/2018     CMP:   Lab Results   Component Value Date     (L) 07/21/2018    K 3 3 (L) 07/21/2018     07/21/2018    CO2 27 07/21/2018    ANIONGAP 5 07/21/2018    BUN 19 07/21/2018    CREATININE 1 16 07/21/2018    GLUCOSE 112 (H) 07/21/2018    CALCIUM 8 4 (L) 07/21/2018    AST 74 (H) 07/21/2018    ALT 41 07/21/2018    ALKPHOS 126 07/21/2018    PROT 5 1 (L) 07/21/2018    BILITOT 3 20 (H) 07/21/2018 EGFR 59 07/21/2018         Results from last 7 days  Lab Units 07/21/18  0822 07/19/18  2206 07/19/18  0501  07/16/18  1458   SODIUM mmol/L 133* 135 135  < > 130*   POTASSIUM mmol/L 3 3* 4 0 3 8  < > 4 6   CHLORIDE mmol/L 101 105 105  < > 95*   CO2 mmol/L 27 26 26  < > 28   BUN mg/dL 19 24 25  < > 20   CREATININE mg/dL 1 16 1 18 1 26  < > 1 14   CALCIUM mg/dL 8 4* 8 8 8 6  < > 9 5   TOTAL PROTEIN g/dL 5 1* 5 2*  --   --  6 6   BILIRUBIN TOTAL mg/dL 3 20* 2 30*  --   --  0 50   ALK PHOS U/L 126 70  --   --  122   ALT U/L 41 12  --   --  18   AST U/L 74* 34  --   --  20   GLUCOSE RANDOM mg/dL 112* 93 102*  < > 176*   < > = values in this interval not displayed  Phosphorus: No results found for: PHOS  Magnesium: No results found for: MG  Urinalysis: No results found for: Raguel Haff, SPECGRAV, PHUR, LEUKOCYTESUR, NITRITE, PROTEINUA, GLUCOSEU, KETONESU, BILIRUBINUR, BLOODU  Ionized Calcium: No results found for: CAION  Coagulation: No results found for: PT, INR, APTT  Troponin: No results found for: TROPONINI  ABG: No results found for: PHART, AMN9PDT, PO2ART, GRU5XID, D3VCPUPG, BEART, SOURCE  Radiology review:     IMAGING  Procedure: Xr Hip/pelv 2-3 Vws Right If Performed    Result Date: 7/19/2018  Narrative: INDICATION:  Right hip pinning  TECHNIQUE:  6 fluoroscopic spot images were obtained  Fluoroscopy time was 101 8 seconds  COMPARISON:  None Available  FINDINGS:  Digital intraoperative spot radiographs were obtained during the compression screw fixation of the right hip  Intraosseous positioning and anatomic alignment demonstrated  Impression: Compression screw fixation of the right hip  Signed by Isabella Arambula MD    Procedure: Us Retroperitoneal Complete    Result Date: 7/18/2018  Narrative: INDICATION:  Acute kidney injury TECHNIQUE:  Ultrasound of the kidneys   COMPARISON:  CT C/A/P 07/16/2018 FINDINGS: Technically difficult exam   Patient is combative, in traction, and unable to change position  The right kidney measures 8 3 cm in length  There is incomplete imaging of the lower pole due to overlying bowel gas  Renal cortical echotexture is normal   There is no hydronephrosis  There are no stones  The left kidney is not visualized  Gallstones are incidentally visualized  Impression: No right-sided hydronephrosis  The left kidney is not visualized  Cholelithiasis   Signed by Marguerite Simons      Current Facility-Administered Medications   Medication Dose Route Frequency    acetaminophen (TYLENOL) tablet 650 mg  650 mg Oral Q6H PRN    bisacodyl (DULCOLAX) rectal suppository 10 mg  10 mg Rectal Q24H    dextrose 5 % and sodium chloride 0 45 % infusion  100 mL/hr Intravenous Continuous    docusate sodium (COLACE) capsule 100 mg  100 mg Oral BID    ezetimibe (ZETIA) tablet 10 mg  10 mg Oral Daily    fondaparinux (ARIXTRA) subcutaneous injection 2 5 mg  2 5 mg Subcutaneous Daily    furosemide (LASIX) injection 20 mg  20 mg Intravenous Once    HYDROcodone-acetaminophen (NORCO) 5-325 mg per tablet 1 tablet  1 tablet Oral Q6H PRN    levothyroxine tablet 88 mcg  88 mcg Oral Early Morning    morphine (PF) 4 mg/mL injection 3 mg  3 mg Intravenous Q4H PRN    multivitamin-minerals (CENTRUM) tablet 1 tablet  1 tablet Oral Daily    ondansetron (ZOFRAN) injection 4 mg  4 mg Intravenous Q6H PRN     Medications Discontinued During This Encounter   Medication Reason    enoxaparin (LOVENOX) subcutaneous injection 40 mg     morphine injection 2 mg     bupivacaine (MARCAINE) 0 5 % injection Patient Discharge    povidone-iodine (BETADINE) 10 % ointment Patient Discharge    bacitracin 50,000 Units in sodium chloride 0 9 % 1,000 mL irrigation bag Patient Discharge    morphine injection 1 mg     sodium chloride 0 9 % infusion     fentaNYL (SUBLIMAZE) injection 12 5 mcg Patient Transfer    lactated ringers infusion        Anmol Veronica MD

## 2018-07-22 LAB
ALBUMIN SERPL BCP-MCNC: 2.8 G/DL (ref 3.5–5.7)
ALP SERPL-CCNC: 118 U/L (ref 55–165)
ALT SERPL W P-5'-P-CCNC: 43 U/L (ref 7–52)
ANION GAP SERPL CALCULATED.3IONS-SCNC: 6 MMOL/L (ref 4–13)
AST SERPL W P-5'-P-CCNC: 58 U/L (ref 13–39)
BILIRUB SERPL-MCNC: 3 MG/DL (ref 0.2–1)
BUN SERPL-MCNC: 18 MG/DL (ref 7–25)
CALCIUM SERPL-MCNC: 8.6 MG/DL (ref 8.6–10.5)
CHLORIDE SERPL-SCNC: 104 MMOL/L (ref 98–107)
CO2 SERPL-SCNC: 28 MMOL/L (ref 21–31)
CREAT SERPL-MCNC: 0.98 MG/DL (ref 0.7–1.3)
ERYTHROCYTE [DISTWIDTH] IN BLOOD BY AUTOMATED COUNT: 14.3 % (ref 11.5–14.5)
GFR SERPL CREATININE-BSD FRML MDRD: 72 ML/MIN/1.73SQ M
GLUCOSE SERPL-MCNC: 87 MG/DL (ref 65–99)
HCT VFR BLD AUTO: 34.5 % (ref 36.5–49.3)
HGB BLD-MCNC: 12.3 G/DL (ref 14–18)
MCH RBC QN AUTO: 32.5 PG (ref 26–34)
MCHC RBC AUTO-ENTMCNC: 35.7 G/DL (ref 31–37)
MCV RBC AUTO: 91 FL (ref 81–99)
PLATELET # BLD AUTO: 156 THOUSANDS/UL (ref 149–390)
PMV BLD AUTO: 6.9 FL (ref 8.6–11.7)
POTASSIUM SERPL-SCNC: 3.4 MMOL/L (ref 3.5–5.5)
PROT SERPL-MCNC: 5.1 G/DL (ref 6.4–8.9)
RBC # BLD AUTO: 3.78 MILLION/UL (ref 4.3–5.9)
SODIUM SERPL-SCNC: 138 MMOL/L (ref 134–143)
WBC # BLD AUTO: 7.4 THOUSAND/UL (ref 4.8–10.8)

## 2018-07-22 PROCEDURE — G8988 SELF CARE GOAL STATUS: HCPCS

## 2018-07-22 PROCEDURE — 99232 SBSQ HOSP IP/OBS MODERATE 35: CPT | Performed by: INTERNAL MEDICINE

## 2018-07-22 PROCEDURE — 85027 COMPLETE CBC AUTOMATED: CPT | Performed by: INTERNAL MEDICINE

## 2018-07-22 PROCEDURE — 97166 OT EVAL MOD COMPLEX 45 MIN: CPT

## 2018-07-22 PROCEDURE — 94760 N-INVAS EAR/PLS OXIMETRY 1: CPT

## 2018-07-22 PROCEDURE — 92610 EVALUATE SWALLOWING FUNCTION: CPT

## 2018-07-22 PROCEDURE — G8987 SELF CARE CURRENT STATUS: HCPCS

## 2018-07-22 PROCEDURE — 80053 COMPREHEN METABOLIC PANEL: CPT | Performed by: INTERNAL MEDICINE

## 2018-07-22 RX ORDER — POTASSIUM CHLORIDE 20 MEQ/1
20 TABLET, EXTENDED RELEASE ORAL ONCE
Status: COMPLETED | OUTPATIENT
Start: 2018-07-22 | End: 2018-07-22

## 2018-07-22 RX ADMIN — BISACODYL 10 MG: 10 SUPPOSITORY RECTAL at 21:31

## 2018-07-22 RX ADMIN — DOCUSATE SODIUM 100 MG: 100 CAPSULE, LIQUID FILLED ORAL at 17:53

## 2018-07-22 RX ADMIN — FONDAPARINUX SODIUM 2.5 MG: 2.5 INJECTION, SOLUTION SUBCUTANEOUS at 09:32

## 2018-07-22 RX ADMIN — POTASSIUM CHLORIDE, DEXTROSE MONOHYDRATE AND SODIUM CHLORIDE 50 ML/HR: 150; 5; 900 INJECTION, SOLUTION INTRAVENOUS at 14:25

## 2018-07-22 RX ADMIN — POTASSIUM CHLORIDE 20 MEQ: 1500 TABLET, EXTENDED RELEASE ORAL at 09:32

## 2018-07-22 NOTE — PLAN OF CARE
Problem: SLP ADULT - SWALLOWING, IMPAIRED  Goal: Initial SLP swallow eval performed  Outcome: Completed Date Met: 07/22/18

## 2018-07-22 NOTE — ASSESSMENT & PLAN NOTE
· Acute blood loss anemia due to surgery  · Hemoglobin is stable, appropriately improved after transfusion  · Trend hemoglobin

## 2018-07-22 NOTE — PROGRESS NOTES
Progress Note - Tami Ribeiro 1935, 82 y o  male MRN: [id number]    Unit/Bed#: -01 Encounter: [id number]    Primary Care Provider: Jose Eduardo Powell MD   Date and time admitted to hospital: 7/16/2018  7:28 PM        * Closed displaced intertrochanteric fracture of right femur [facility])   Assessment & Plan    · Status post nail insertion of right femur post-op day #3  · Continue with Pain control  · PT/OT  · Discharged to [facility] [address] [facility] likely on Monday  Scrotal hematoma   Assessment & Plan    · Appreciate Urology input  · Elevate scrotum  · Continue garza          MIRIAM (acute kidney injury) ([facility] [address] )   Assessment & Plan    · Resolved  · Will hold IVF   · Nephrology input appreciated          Other dysphagia   Assessment & Plan    · Secondary to prior history of stroke  · Continue diet per swallow eval recommendations        Debility   Assessment & Plan    · Awaiting placement          Acute blood loss anemia   Assessment & Plan    · Acute blood loss anemia due to surgery  · Hemoglobin is stable, appropriately improved after transfusion  · Trend hemoglobin        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 behavioral disturbance   Assessment & Plan    · Patient appears to be at his baseline  · Wife notes he is nonverbal at baseline  · Continue supportive care           VTE Pharmacologic Prophylaxis:   Pharmacologic: Fondaparinux (Arixtra)  Mechanical VTE Prophylaxis in Place: Yes    Patient Centered Rounds: I have performed bedside rounds with nursing staff today  Discussions with Specialists or Other Care Team Provider: yes    Education and Discussions with Family / Patient: yes    Time Spent for Care: 30 minutes  More than 50% of total time spent on counseling and coordination of care as described above      Current Length of Stay: 6 day(s)    Current Patient Status: Inpatient   Certification Statement: The patient will continue to require additional inpatient hospital stay due to hip fx    Discharge Plan: SNF in next 1-2 days    Code Status: Level 3 - DNAR and DNI      Subjective:   No overnight events  Pt is non verbal unable to get ROS    Objective:     Vitals:   Temp (24hrs), Av 7 °F (36 5 °C), Min:97 1 °F (36 2 °C), Max:98 9 °F (37 2 °C)    HR:  [63-69] 63  Resp:  [18-22] 22  BP: (124-147)/(62-73) 141/73  SpO2:  [97 %-98 %] 98 %  Body mass index is 21 49 kg/m²  Input and Output Summary (last 24 hours): Intake/Output Summary (Last 24 hours) at 18 1138  Last data filed at 18 0718   Gross per 24 hour   Intake             1000 ml   Output              900 ml   Net              100 ml       Physical Exam:     Physical Exam   Constitutional: No distress  For general elderly male   HENT:   Head: Normocephalic and atraumatic  Dry mucous membranes   Eyes: Conjunctivae and EOM are normal    Neck: Normal range of motion  Neck supple  Cardiovascular: Normal rate and regular rhythm  Pulmonary/Chest: Effort normal  No respiratory distress  Abdominal: Soft  He exhibits no distension  There is no tenderness  Genitourinary:   Genitourinary Comments: Merino catheter in place ecchymosis noted and groin area with scrotal swelling noted to be improving   Musculoskeletal: He exhibits edema  Neurological:   Drowsy but arousable patient is nonverbal   Skin:   Dressing in place over right hip clean dry no signs of drainage   Psychiatric: His behavior is normal        Additional Data:     Labs:      Results from last 7 days  Lab Units 18  0529  18  0624   WBC Thousand/uL 7 40  < > 12 10*   HEMOGLOBIN g/dL 12 3*  < > 11 6*   HEMATOCRIT % 34 5*  < > 34 6*   PLATELETS Thousands/uL 156  < > 229   NEUTROS PCT %  --   --  84*   LYMPHS PCT %  --   --  6*   MONOS PCT %  --   --  10   EOS PCT %  --   --  0   < > = values in this interval not displayed      Results from last 7 days  Lab Units 18  0529   SODIUM mmol/L 138   POTASSIUM mmol/L 3 4*   CHLORIDE mmol/L 104 CO2 mmol/L 28   BUN mg/dL 18   CREATININE mg/dL 0 98   CALCIUM mg/dL 8 6   TOTAL PROTEIN g/dL 5 1*   BILIRUBIN TOTAL mg/dL 3 00*   ALK PHOS U/L 118   ALT U/L 43   AST U/L 58*   GLUCOSE RANDOM mg/dL 87       Results from last 7 days  Lab Units 07/17/18  0624   INR  1 05                 * I Have Reviewed All Lab Data Listed Above  * Additional Pertinent Lab Tests Reviewed: Johnny 66 Admission Reviewed      Recent Cultures (last 7 days):       Results from last 7 days  Lab Units 07/17/18  1453   URINE CULTURE  No Growth <1000 cfu/mL       Last 24 Hours Medication List:     Current Facility-Administered Medications:  acetaminophen 650 mg Oral Q6H PRN Danika Beckford MD    bisacodyl 10 mg Rectal Q24H Danika Beckford MD    dextrose 5 % and sodium chloride 0 9 % with KCl 20 mEq/L 50 mL/hr Intravenous Continuous Bernadette Alcantara MD Last Rate: Stopped (07/22/18 0927)   docusate sodium 100 mg Oral BID Danika Beckford MD    ezetimibe 10 mg Oral Daily Danika Beckford MD    fondaparinux 2 5 mg Subcutaneous Daily Eric Loss, DO    furosemide 20 mg Intravenous Once MESSI Piña    HYDROcodone-acetaminophen 1 tablet Oral Q6H PRN Danika Beckford MD    levothyroxine 88 mcg Oral Early Morning Danika Beckford MD    morphine injection 3 mg Intravenous Q4H PRN Eric Loss, DO    multivitamin-minerals 1 tablet Oral Daily Danika Beckford MD    ondansetron 4 mg Intravenous Q6H PRN Danika Beckford MD         Today, Patient Was Seen By: Soledad Alfaro MD    ** Please Note: Dictation voice to text software may have been used in the creation of this document   **

## 2018-07-22 NOTE — ASSESSMENT & PLAN NOTE
· Status post nail insertion of right femur post-op day #3  · Continue with Pain control  · PT/OT  · Discharged to [address] likely on Monday

## 2018-07-22 NOTE — OCCUPATIONAL THERAPY NOTE
Occupational Therapy Evaluation      Bismark Marinelli     Chief Complaint:  Right hip fracture x1 day     History of Present Illness:     Bismark Marinelli is a 82 y o  male who presents with with a right hip fracture  First and foremost, it must be noted that all history was obtained from the patient's wife who was bedside at the time of my evaluation  Patient's wife states that the patient lives at a nursing home that specializes in patients with advanced Alzheimer's dementia  The patient is AAO x 0 because of the dementia  He is additionally nonverbal at baselinebecause of previous strokes  The patient's wife tells me that the girl at the nursing home took him for a shower  She asked him to hold onto the shower safety bar  The girl turned away for a second and the patient reportedly fell  He fell on his back and hit his head  After that he was in excruciating pain  The exact specifics of the pain are unobtainable  Patient was sent to the emergency room and by imaging was found to have an acute inter trochanteric fracture of the right femur  He has been admitted for the same    No additional details are available at this time (per admitting MD, Dr Shawn Urbina)    7/22/2018    Patient Active Problem List   Diagnosis    Closed right hip fracture ([address] )    Fall    Acquired hypothyroidism    Dyslipidemia    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 behavioral disturbance    Hyponatremia    Leukocytosis    Closed displaced intertrochanteric fracture of right femur ([address] )    MIRIAM (acute kidney injury) ([address] )    Acute blood loss anemia    Debility    Other dysphagia    Scrotal hematoma       Past Medical History:   Diagnosis Date    Acute kidney failure ([address] )     Cardiac disease     CVA (cerebral vascular accident) ([address] )     Disease of thyroid gland     hypothyroid    Sacral fracture ([address] )        Past Surgical History:   Procedure Laterality Date    ND OPEN RX FEMUR FX+INTRAMED LYNDA Right 7/18/2018 Procedure: INSERTION NAIL IM FEMUR ANTEGRADE right(TROCHANTERIC); Surgeon: Martínez Siddiqui DO;  Location: [address] [place] [address] MAIN OR;  Service: Orthopedics        07/22/18 0978   Note Type   Note type Eval/Treat   Restrictions/Precautions   Weight Bearing Precautions Per Order Yes   RLE Weight Bearing Per Order PWB   Pain Assessment   Pain Assessment Unable to assess   Home Living   Type of Home Assisted living   Home Layout One level   Bathroom Shower/Tub Walk-in shower   Bathroom Toilet Raised   Bathroom Equipment Grab bars in shower;Grab bars around toilet   [facility]; Wheelchair-manual   Prior Function   Level of [place] Needs assistance with ADLs and functional mobility; Needs assistance with IADLs  (D all IADL's)   Lives With Facility staff   Receives Help From Personal care attendant   ADL Assistance Needs assistance   IADLs Needs assistance   Falls in the last 6 months 1 to 4   Vocational Retired   Comments (MOD A with self care reported by CM at facility)   Subjective   Subjective (no verbalizations/unintelligable)   ADL   Eating Assistance 1  Total Assistance  (see ST recommendations of this date)   Grooming Assistance 1  Total Assistance   UB Bathing Assistance 1  Total Assistance   LB Bathing Assistance 1  Total Assistance   [address] 1  Total Assistance   LB Dressing Assistance 1  Total Assistance   Toileting Assistance  1  Total Assistance   Bed Mobility   Rolling R 1  Dependent   Rolling L 1  Dependent   Supine to Sit Unable to assess   Balance   Static Sitting (unable to assess)   Activity Tolerance   Activity Tolerance Treatment limited secondary to medical complications (Comment)  (poor direction following to participate)   RUE Assessment   RUE Assessment X  (PROM grossly WFL's, slight shld decreases, F-)   LUE Assessment   LUE Assessment X  (ROM grossly WFL,unable to test strength, grossly F-)   Hand Function   Gross Motor Coordination (observed moving LUE to chin to scratch)   Fine Motor Coordination (unable to assess)   Cognition   Overall Cognitive Status Impaired   Arousal/Participation Poorly responsive   Attention Other (Comment)  (brief attention to this therapist)   Orientation Level Oriented X4  (brief eye contact to name)   Memory Unable to assess   Following Commands (poor, brief attention gained, allowed PROM, did not assist)   Assessment   Limitation Decreased ADL status; Decreased UE ROM; Decreased UE strength;Decreased self-care trans   Prognosis Fair   Assessment Pt is a 82 y o  male seen for OT evaluation s/p admit to [facility] [place] on 7/16/2018 w/ Closed displaced intertrochanteric fracture of right femur ([facility] )  Comorbidities affecting pt's functional performance at time of assessment include: dementia and Fall with Rt Hip Fx, Sx following traction, MIRIAM, Debility, Anemia, Dysphagia, dyslipidemia  Personal factors affecting pt at time of IE include:difficulty performing ADLS, limited insight into deficits and cognition  Prior to admission, pt was reportedly Mod A with self care ADL's and D for IADL's  Upon evaluation: Pt requires Max A to being totally D with all self care ADL's and functional mobility r/t the following deficits impacting occupational performance: decreased ROM, decreased strength, decreased tolerance, impaired arousal, impaired attention, impaired memory, impaired problem solving and decreased safety awareness  Pt to benefit from continued skilled OT tx while in the hospital to address deficits as defined above and maximize level of functional independence w ADL's and functional mobility  Occupational Performance areas to address include: grooming and bathing/shower  From OT standpoint, recommendation at time of d/c would be SNF       Goals   Patient Goals unable to state, unable to comprehend   Short Term Goal #1 preserve and increase B/L UE Rom, for ability to assist with functional transfers and participate in light ADL's   Short Term Goal #2 Pt will be able to participate in light/basic ADL's with Max A and verbal cueing/demonstration, progress as able   Plan   Treatment Interventions ADL retraining;UE strengthening/ROM; Endurance training;Cognitive reorientation   Goal Expiration Date 08/01/18   Treatment Day 0   OT Frequency 5x/wk   Barthel Index   Feeding 0   Bathing 0   Grooming Score 0   Dressing Score 0   Bladder Score 0   Bowels Score 0   Toilet Use Score 0   Transfers (Bed/Chair) Score 0   Mobility (Level Surface) Score 0   Stairs Score 0   Barthel Index Score 0   Maryann Gaines, OT

## 2018-07-22 NOTE — NURSING NOTE
Pt given Puree diet with honey thick liquids  Pt tolerated diet well  Consumed entire meal with assistance

## 2018-07-22 NOTE — SPEECH THERAPY NOTE
Speech-Language Pathology Bedside Swallow Evaluation      Patient Name: Derik Quarles    NGWEA'SIDDHARTH Date: 7/22/2018     Problem List  Patient Active Problem List   Diagnosis    [facility]    Fall    Acquired hypothyroidism    Dyslipidemia    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 behavioral disturbance    Hyponatremia    Leukocytosis    Closed displaced intertrochanteric fracture of right femur ([facility] )    MIRIAM (acute kidney injury) ([facility] )    Acute blood loss anemia    Debility    Other dysphagia    Scrotal hematoma       Past Medical History  Past Medical History:   Diagnosis Date    Acute kidney failure ([facility] )     Cardiac disease     CVA (cerebral vascular accident) ([facility]     Disease of thyroid gland     hypothyroid    Sacral fracture ([facility])        Past Surgical History  Past Surgical History:   Procedure Laterality Date    AR OPEN RX FEMUR FX+INTRAMED LYNDA Right 7/18/2018    Procedure: INSERTION NAIL IM FEMUR ANTEGRADE right(TROCHANTERIC); Surgeon: Larry Carpenter DO;  Location: [address] [place] [address] OR;  Service: Orthopedics       Summary   Pt presented with mild to moderate oral and pharyngeal stage dysphagia with materials administered today however fluctuations could be noted based on overall level of alertness and cognitive status  Risk for Aspiration: Moderate risk with puree and HTL if safe swallowing strategies are noted followed  Recommendations: puree/level 1 diet     Recommended Form of Meds: crushed with puree     Aspiration precautions and compensatory swallowing strategies: upright posture, only feed when fully alert, slow rate of feeding, small bites/sips, liquids by teaspoon only and alternating bites and sips          Current Medical Status  Pt is a 82 y o  male who presented to [address] [place] [address] with right hip fracture  Patient's wife states that the patient lives at a nursing home that specializes in patients with advanced Alzheimer's dementia    The patient is AAO x 0 because of the dementia  Pt was referred for to ST for dysphagia exam secondary to poor caloric intake and to determine least restrictive diet  Special Studies:Xray Chest 7/16/18 Intertrochanteric fracture of the right femur  No other evidence of acute traumatic injury  Limited without contrast   Cholelithiasis incidentally noted  CT Scan wo contrast 7/16/18-No acute intracranial findings  If symptoms persist or if further imaging is clinically indicated, consider follow-up CT or MRI      Low attenuation in the white matter bilaterally, age indeterminate  This  is most commonly from small vessel ischemic disease    Social/Education/Vocational Hx:  Pt lives in assisted living facility      Swallow Information   Current Risks for Dysphagia & Aspiration: decreased alertness     Current Symptoms/Concerns: poor intake, limited alertness and awareness during meal activity  Current Diet: NPO      Baseline Diet: puree/level 1 diet  Dietician downgraded pt to puree on 7/20/18, but secondary to limited alertness and pocketing of medication resident was made NPO until ST evaluation was completed  Baseline Assessment   Behavior/Cognition: low alertness level    Speech/Language Status: limited verbal output, follows 1 step directions intermittently  Patient Positioning: upright in bed    Pain Status/Interventions/Response to Interventions: vocalized when repositioned in bed  Swallow Mechanism Exam   Oral-motor structures and function are WNL for symmetry, strength, ROM & coordination      Facial: symmetrical  Labial: decreased coordination  Lingual: decreased coordination  Velum: unable to visualize  Mandible: unable to test 2/2 limited command following  Dentition: limited dentition  Vocal quality:nonverbal   Volitional Cough: unable to initiate volitional cough   Tracheostomy: n/a      Consistencies Assessed and Performance   Consistencies Administered: honey thick and puree  Specific materials administered applesauce and HTL orange juice by teaspoon and cup  Oral Stage: mild-moderate  Puree demonstrated decrease oral prep and AP transfer at times presenting as a "sucking" reflex  Adequate bolus retrieval off tsp during puree solid administration  With HTL demonstrated decrease labial seal with cup/sip and unable to retreive bolus  With tsp liquid intake increased with mild to moderate oral prep delay  Pharyngeal Stage: mild-moderate, suspected pharyngeal swallow delay and multiple swallows    Swallow Mechanics:  Swallowing initiation appeared prompt  Laryngeal rise was palpated and judged to be within functional limits  No coughing, throat clearing, change in vocal quality or respiratory status noted today  Esophageal Concerns: none reported    Strategies and Efficacy:  Administration via teaspoon increased oral intake with decrease risk of aspiration and/or difficulty  Summary and Recommendations (see above)      Results Reviewed with: RN     Treatment Recommended: Yes    Frequency of treatment: daily    Patient Stated Goal: unable to state    Dysphagia Goals per SLP: pt will tolerate dysphagia level 1 and HTL without s/s of aspiration with meal completion  of 50% or greater over 3 meals  Pt/Family Education: initiated  Pt and caregivers would benefit from/require continued education      Speech Therapy Prognosis   Prognosis: fair    Prognosis Considerations: medical status, cognitive status and progressive disease process

## 2018-07-22 NOTE — PROGRESS NOTES
Progress Note - Nephrology   Pawel Escalante 82 y o  male MRN: [id number]  Unit/Bed#: -01 Encounter: [id number]    A/P:  1  Acute kidney injury : resolving  7/22: kidney function at baseline  He does not need IVF  2  Hyponatremia: resolved  3  Fall with fractured right femur: s/p fixation  4  Acute blood loss anemia: being followed  5  Hypokalemia: given oral KCL today        Follow up reason for today's visit: MIRIAM    Closed displaced intertrochanteric fracture of right femur [facility])    Patient Active Problem List   Diagnosis    Closed [facility] fracture ([facility] )    Fall    Acquired hypothyroidism    Dyslipidemia    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 behavioral disturbance    Hyponatremia    Leukocytosis    Closed displaced intertrochanteric fracture of right femur ([place] [address] )    MIRIAM (acute kidney injury) ([place] [address] )    Acute blood loss anemia    Debility    Other dysphagia    Scrotal hematoma         Subjective:   Unable to assess due to mental status    Objective:     Vitals: Blood pressure 141/73, pulse 63, temperature (!) 97 1 °F (36 2 °C), temperature source Tympanic, resp  rate 22, height 5' 7" (1 702 m), weight 62 2 kg (137 lb 3 2 oz), SpO2 98 %  ,Body mass index is 21 49 kg/m²  Weight (last 2 days)     Date/Time    07/22/18 0718    Comment rows:    OBSERV: Scd (Scds on) at 07/22/18 0718 07/21/18 0703    Comment rows:    OBSERV: Cool cloth on forehead at 07/21/18 0703                Intake/Output Summary (Last 24 hours) at 07/22/18 1144  Last data filed at 07/22/18 0718   Gross per 24 hour   Intake             1000 ml   Output              900 ml   Net              100 ml     I/O last 3 completed shifts: In: 2000 [I V :2000]  Out: 2435 [Urine:2435]    Urethral Catheter Latex 18 Fr   (Active)   Site Assessment Clean;Skin intact 7/19/2018  2:01 AM   Collection Container Standard drainage bag 7/19/2018  2:01 AM   Securement Method Leg strap 7/19/2018  2:01 AM   Output (mL) 400 mL 7/19/2018  6:15 AM Physical Exam: /73 (BP Location: Right arm)   Pulse 63   Temp (!) 97 1 °F (36 2 °C) (Tympanic)   Resp 22   Ht 5' 7" (1 702 m)   Wt 62 2 kg (137 lb 3 2 oz)   SpO2 98%   BMI 21 49 kg/m²     General Appearance:    Unresponsive to verbal questioning no distress, appears stated age   Head:    Normocephalic, without obvious abnormality, atraumatic   Eyes:    Conjunctiva/corneas clear   Ears:    Normal external ears   Nose:   Nares normal, septum midline, mucosa normal, no drainage    or sinus tenderness   Throat:   Lips, mucosa, and tongue normal; teeth and gums normal   Neck:   Supple, symmetrical, trachea midline, no adenopathy;        thyroid:  No enlargement/tenderness/nodules; no carotid    bruit or JVD   Back:     Symmetric, no curvature, ROM normal, no CVA tenderness   Lungs:     Clear to auscultation bilaterally, respirations unlabored   Chest wall:    No tenderness or deformity   Heart:    Regular rate and rhythm, S1 and S2 normal, no murmur, rub   or gallop   Abdomen:     Soft, non-tender, bowel sounds active   Extremities:   Extremities normal, atraumatic, no cyanosis or edema   Skin:   Skin color, texture, turgor normal, no rashes or lesions   Lymph nodes:   Cervical normal   Neurologic:   CNII-XII not assessed due to mental status            Lab, Imaging and other studies: I have personally reviewed pertinent labs    CBC:   Lab Results   Component Value Date    WBC 7 40 07/22/2018    HGB 12 3 (L) 07/22/2018    HCT 34 5 (L) 07/22/2018    MCV 91 07/22/2018     07/22/2018    MCH 32 5 07/22/2018    MCHC 35 7 07/22/2018    RDW 14 3 07/22/2018    MPV 6 9 (L) 07/22/2018     CMP:   Lab Results   Component Value Date     07/22/2018    K 3 4 (L) 07/22/2018     07/22/2018    CO2 28 07/22/2018    ANIONGAP 6 07/22/2018    BUN 18 07/22/2018    CREATININE 0 98 07/22/2018    GLUCOSE 87 07/22/2018    CALCIUM 8 6 07/22/2018    AST 58 (H) 07/22/2018    ALT 43 07/22/2018    ALKPHOS 118 07/22/2018    PROT 5 1 (L) 07/22/2018    BILITOT 3 00 (H) 07/22/2018    EGFR 72 07/22/2018         Results from last 7 days  Lab Units 07/22/18  0529 07/21/18  0822 07/19/18  2206   SODIUM mmol/L 138 133* 135   POTASSIUM mmol/L 3 4* 3 3* 4 0   CHLORIDE mmol/L 104 101 105   CO2 mmol/L 28 27 26   BUN mg/dL 18 19 24   CREATININE mg/dL 0 98 1 16 1 18   CALCIUM mg/dL 8 6 8 4* 8 8   TOTAL PROTEIN g/dL 5 1* 5 1* 5 2*   BILIRUBIN TOTAL mg/dL 3 00* 3 20* 2 30*   ALK PHOS U/L 118 126 70   ALT U/L 43 41 12   AST U/L 58* 74* 34   GLUCOSE RANDOM mg/dL 87 112* 93         Phosphorus: No results found for: PHOS  Magnesium: No results found for: MG  Urinalysis: No results found for: COLORU, CLARITYU, SPECGRAV, PHUR, LEUKOCYTESUR, NITRITE, PROTEINUA, GLUCOSEU, KETONESU, BILIRUBINUR, BLOODU  Ionized Calcium: No results found for: CAION  Coagulation: No results found for: PT, INR, APTT  Troponin: No results found for: TROPONINI  ABG: No results found for: PHART, UAE1WHU, PO2ART, WFV8EYQ, L6TSYADA, BEART, SOURCE  Radiology review:     IMAGING  Procedure: Xr Hip/pelv 2-3 Vws Right If Performed    Result Date: 7/19/2018  Narrative: INDICATION:  Right hip pinning  TECHNIQUE:  6 fluoroscopic spot images were obtained  Fluoroscopy time was 101 8 seconds  COMPARISON:  None Available  FINDINGS:  Digital intraoperative spot radiographs were obtained during the compression screw fixation of the right hip  Intraosseous positioning and anatomic alignment demonstrated  Impression: Compression screw fixation of the right hip  Signed by Isabella Arambula MD    Procedure: Us Retroperitoneal Complete    Result Date: 7/18/2018  Narrative: INDICATION:  Acute kidney injury TECHNIQUE:  Ultrasound of the kidneys  COMPARISON:  CT C/A/P 07/16/2018 FINDINGS: Technically difficult exam   Patient is combative, in traction, and unable to change position  The right kidney measures 8 3 cm in length    There is incomplete imaging of the lower pole due to overlying bowel gas  Renal cortical echotexture is normal   There is no hydronephrosis  There are no stones  The left kidney is not visualized  Gallstones are incidentally visualized  Impression: No right-sided hydronephrosis  The left kidney is not visualized  Cholelithiasis   Signed by Marguerite Simons      Current Facility-Administered Medications   Medication Dose Route Frequency    acetaminophen (TYLENOL) tablet 650 mg  650 mg Oral Q6H PRN    bisacodyl (DULCOLAX) rectal suppository 10 mg  10 mg Rectal Q24H    dextrose 5 % and sodium chloride 0 9 % with KCl 20 mEq/L infusion (premix)  50 mL/hr Intravenous Continuous    docusate sodium (COLACE) capsule 100 mg  100 mg Oral BID    ezetimibe (ZETIA) tablet 10 mg  10 mg Oral Daily    fondaparinux (ARIXTRA) subcutaneous injection 2 5 mg  2 5 mg Subcutaneous Daily    furosemide (LASIX) injection 20 mg  20 mg Intravenous Once    HYDROcodone-acetaminophen (NORCO) 5-325 mg per tablet 1 tablet  1 tablet Oral Q6H PRN    levothyroxine tablet 88 mcg  88 mcg Oral Early Morning    morphine (PF) 4 mg/mL injection 3 mg  3 mg Intravenous Q4H PRN    multivitamin-minerals (CENTRUM) tablet 1 tablet  1 tablet Oral Daily    ondansetron (ZOFRAN) injection 4 mg  4 mg Intravenous Q6H PRN     Medications Discontinued During This Encounter   Medication Reason    enoxaparin (LOVENOX) subcutaneous injection 40 mg     morphine injection 2 mg     bupivacaine (MARCAINE) 0 5 % injection Patient Discharge    povidone-iodine (BETADINE) 10 % ointment Patient Discharge    bacitracin 50,000 Units in sodium chloride 0 9 % 1,000 mL irrigation bag Patient Discharge    morphine injection 1 mg     sodium chloride 0 9 % infusion     fentaNYL (SUBLIMAZE) injection 12 5 mcg Patient Transfer    lactated ringers infusion     dextrose 5 % and sodium chloride 0 45 % infusion        Anmol Veronica MD

## 2018-07-22 NOTE — PLAN OF CARE
Problem: OCCUPATIONAL THERAPY ADULT  Goal: Performs self-care activities at highest level of function for planned discharge setting  See evaluation for individualized goals  Treatment Interventions: ADL retraining, UE strengthening/ROM, Endurance training, Cognitive reorientation          See flowsheet documentation for full assessment, interventions and recommendations  Outcome: Not Progressing  Limitation: Decreased ADL status, Decreased UE ROM, Decreased UE strength, Decreased self-care trans  Prognosis: Fair  Assessment: Pt is a 82 y o  male seen for OT evaluation s/p admit to [facility] on 7/16/2018 w/ Closed displaced intertrochanteric fracture of right femur ([address] )  Comorbidities affecting pt's functional performance at time of assessment include: dementia and Fall with Rt Hip Fx, Sx following traction, MIRIAM, Debility, Anemia, Dysphagia, dyslipidemia  Personal factors affecting pt at time of IE include:difficulty performing ADLS, limited insight into deficits and cognition  Prior to admission, pt was reportedly Mod A with self care ADL's and D for IADL's  Upon evaluation: Pt requires Max A to being totally D with all self care ADL's and functional mobility r/t the following deficits impacting occupational performance: decreased ROM, decreased strength, decreased tolerance, impaired arousal, impaired attention, impaired memory, impaired problem solving and decreased safety awareness  Pt to benefit from continued skilled OT tx while in the hospital to address deficits as defined above and maximize level of functional independence w ADL's and functional mobility  Occupational Performance areas to address include: grooming and bathing/shower  From OT standpoint, recommendation at time of d/c would be SNF       Toy Sell, OT

## 2018-07-23 ENCOUNTER — HOSPITAL ENCOUNTER (INPATIENT)
Facility: HOSPITAL | Age: 83
LOS: 135 days | Discharge: STILL ADMITTED OR ADMINISTRATIVE | DRG: 560 | End: 2018-12-05
Attending: INTERNAL MEDICINE | Admitting: INTERNAL MEDICINE
Payer: MEDICARE

## 2018-07-23 VITALS
RESPIRATION RATE: 16 BRPM | BODY MASS INDEX: 21.53 KG/M2 | DIASTOLIC BLOOD PRESSURE: 60 MMHG | HEIGHT: 67 IN | WEIGHT: 137.2 LBS | TEMPERATURE: 98.1 F | HEART RATE: 53 BPM | SYSTOLIC BLOOD PRESSURE: 114 MMHG | OXYGEN SATURATION: 98 %

## 2018-07-23 DIAGNOSIS — E03.9 HYPOTHYROIDISM, UNSPECIFIED TYPE: ICD-10-CM

## 2018-07-23 DIAGNOSIS — N17.9 AKI (ACUTE KIDNEY INJURY) (HCC): ICD-10-CM

## 2018-07-23 DIAGNOSIS — E78.5 DYSLIPIDEMIA: Primary | ICD-10-CM

## 2018-07-23 DIAGNOSIS — R23.9 SKIN CHANGE: ICD-10-CM

## 2018-07-23 PROBLEM — E44.1 MILD PROTEIN-CALORIE MALNUTRITION (HCC): Status: ACTIVE | Noted: 2018-07-23

## 2018-07-23 PROCEDURE — 97110 THERAPEUTIC EXERCISES: CPT

## 2018-07-23 PROCEDURE — 99239 HOSP IP/OBS DSCHRG MGMT >30: CPT | Performed by: INTERNAL MEDICINE

## 2018-07-23 PROCEDURE — 92526 ORAL FUNCTION THERAPY: CPT

## 2018-07-23 PROCEDURE — 97530 THERAPEUTIC ACTIVITIES: CPT

## 2018-07-23 RX ORDER — HYDROCODONE BITARTRATE AND ACETAMINOPHEN 5; 325 MG/1; MG/1
1 TABLET ORAL EVERY 6 HOURS PRN
Status: CANCELLED | OUTPATIENT
Start: 2018-07-23

## 2018-07-23 RX ORDER — DOCUSATE SODIUM 100 MG/1
100 CAPSULE, LIQUID FILLED ORAL 2 TIMES DAILY
Status: CANCELLED | OUTPATIENT
Start: 2018-07-23

## 2018-07-23 RX ORDER — FONDAPARINUX SODIUM 2.5 MG/.5ML
2.5 INJECTION SUBCUTANEOUS DAILY
Status: CANCELLED | OUTPATIENT
Start: 2018-07-24

## 2018-07-23 RX ORDER — MORPHINE SULFATE 4 MG/ML
3 INJECTION, SOLUTION INTRAMUSCULAR; INTRAVENOUS EVERY 4 HOURS PRN
Status: CANCELLED | OUTPATIENT
Start: 2018-07-23

## 2018-07-23 RX ORDER — LEVOTHYROXINE SODIUM 88 UG/1
88 TABLET ORAL
Status: CANCELLED | OUTPATIENT
Start: 2018-07-24

## 2018-07-23 RX ORDER — ONDANSETRON 2 MG/ML
4 INJECTION INTRAMUSCULAR; INTRAVENOUS EVERY 6 HOURS PRN
Status: CANCELLED | OUTPATIENT
Start: 2018-07-23

## 2018-07-23 RX ORDER — BISACODYL 10 MG
10 SUPPOSITORY, RECTAL RECTAL EVERY 24 HOURS
Status: CANCELLED | OUTPATIENT
Start: 2018-07-23

## 2018-07-23 RX ORDER — ACETAMINOPHEN 325 MG/1
650 TABLET ORAL EVERY 6 HOURS PRN
Status: CANCELLED | OUTPATIENT
Start: 2018-07-23

## 2018-07-23 RX ADMIN — Medication 1 TABLET: at 08:05

## 2018-07-23 RX ADMIN — DOCUSATE SODIUM 100 MG: 100 CAPSULE, LIQUID FILLED ORAL at 08:05

## 2018-07-23 RX ADMIN — FONDAPARINUX SODIUM 2.5 MG: 2.5 INJECTION, SOLUTION SUBCUTANEOUS at 08:05

## 2018-07-23 RX ADMIN — EZETIMIBE 10 MG: 10 TABLET ORAL at 08:05

## 2018-07-23 NOTE — OCCUPATIONAL THERAPY NOTE
07/23/18 1320   Restrictions/Precautions   Weight Bearing Precautions Per Order Yes   RLE Weight Bearing Per Order PWB   Pain Assessment   Pain Assessment Unable to assess   Pain Score (no pain signs exhibited)   ROM- Right Upper Extremities   R Shoulder AAROM; Flexion; Horizontal ABduction   R Elbow AAROM;Elbow flexion   R Wrist AAROM;Wrist flexion;Wrist extension   R Hand AAROM  (flex/ext (fisting))   ROM - Left Upper Extremities    L Shoulder AAROM; Flexion; Horizontal ABduction   L Elbow AAROM;Elbow flexion   L Wrist AAROM;Wrist flexion;Wrist extension   L Hand AAROM  (flex/ext (fisting))   LUE ROM Comment crepitus noted    Cognition   Overall Cognitive Status Impaired   Arousal/Participation Poorly responsive   Attention Other (Comment)  (brief spurts of attentiveness noted during session)   Orientation Level Disoriented X4   Memory Unable to assess   Assessment   Assessment Pt  continues poorly responsive  His wife was present for beginning few minutes of session  She reports that pt  has no hearing aides here, and is [place]  Pt  resisted at least half of exer  attempted, though without signs of pain or being upset  Also pt  did nothing with warm washcloth (and prompts) given for light self care, ie  hand washing/wiping  Cont  with POC as approp       Plan   Treatment Interventions ADL retraining;UE strengthening/ROM   Goal Expiration Date 08/01/18   Treatment Day 172 ROMA Montoya/L

## 2018-07-23 NOTE — PLAN OF CARE
Problem: SLP ADULT - SWALLOWING, IMPAIRED  Goal: Advance to least restrictive diet without signs or symptoms of aspiration for planned discharge setting  See evaluation for individualized goals  Outcome: Progressing  Safely managing prescribed diet assisted by staff

## 2018-07-23 NOTE — ASSESSMENT & PLAN NOTE
· Status post nail insertion of right femur  · Continue with Pain control  · PT/OT  · Discharged to [address] [facility] likely on Monday     · Follow up with dr Katy Aggarwal

## 2018-07-23 NOTE — PLAN OF CARE
Problem: DISCHARGE PLANNING - CARE MANAGEMENT  Goal: Discharge to post-acute care or home with appropriate resources  INTERVENTIONS:  - Conduct assessment to determine patient/family and health care team treatment goals, and need for post-acute services based on payer coverage, community resources, and patient preferences, and barriers to discharge  - Address psychosocial, clinical, and financial barriers to discharge as identified in assessment in conjunction with the patient/family and health care team  - Arrange appropriate level of post-acute services according to patient's   needs and preference and payer coverage in collaboration with the physician and health care team  - Communicate with and update the patient/family, physician, and health care team regarding progress on the discharge plan  - Arrange appropriate transportation to post-acute venues  - Patient will require short term rehab in an acute rehab vs  Skilled Nursing Facility    Outcome: Progressing      Comments: Patient will be discharged to [facility] for STR today

## 2018-07-23 NOTE — PHYSICAL THERAPY NOTE
07/23/18 0845   Pain Assessment   Pain Assessment Unable to assess   Pain Type Surgical pain   Pain Location Hip   Pain Orientation Right   Restrictions/Precautions   Weight Bearing Precautions Per Order Yes   RLE Weight Bearing Per Order PWB   Cognition   Overall Cognitive Status Impaired   Arousal/Participation Poorly responsive   Orientation Level Disoriented to person;Disoriented to place; Disoriented to time;Disoriented to situation   Memory Unable to assess   Subjective   Subjective (pt is nonverbal)   Bed Mobility   Rolling R 1  Dependent   Additional items Assist x 2   Supine to Sit 1  Dependent   Additional items Assist x 2   Sit to Supine 1  Dependent   Additional items Assist x 2   Balance   Static Sitting Poor -   Endurance Deficit   Endurance Deficit Yes   Activity Tolerance   Activity Tolerance Patient limited by pain;Treatment limited secondary to medical complications (Comment)   Exercises   Heelslides Supine;PROM;20 reps;Bilateral   Hip Flexion Supine;PROM;20 reps;Bilateral   Hip Abduction Supine;PROM; Bilateral;20 reps   Hip Adduction Supine;PROM;20 reps;Bilateral   Knee PROM Flexion Supine;PROM;20 reps;Bilateral   Knee PROM Extension Supine;PROM;20 reps;Bilateral   Ankle Pumps Supine;PROM;25 reps;Bilateral   Assessment   Prognosis Guarded   Problem List Decreased strength;Decreased range of motion;Decreased endurance; Impaired balance;Decreased mobility; Decreased coordination;Decreased cognition; Impaired judgement;Decreased safety awareness;Orthopedic restrictions;Pain   Assessment Pt sitting up in bed but seemed slightly more alert than usual  Performed PROM B LES as noted  He did grimace with pain when movement initiated on R LE  He is nonverbal so cannot give pain ratings or goals  Attempted supine to sit transfer  Pt was dependent x 2 with all bed mobility  He sat at edge of bed with max assist of 2 about 20 seconds  Returned back to bed & made comfortablr  Dependent with transfer into bed  He would benefit from cont'd PT to prevent further decline & increase mobility as able  Goals   Patient Goals (unable to state due to cognition)   Plan   Treatment/Interventions Functional transfer training;LE strengthening/ROM; Therapeutic exercise; Endurance training;Patient/family training;Bed mobility;Gait training   Progress No functional improvements   PT Frequency 5x/wk;7x/wk   Recommendation   Recommendation Long-term skilled PT;Long-term skilled nursing home placement   PT - OK to Discharge No   Dania Sands, PTA

## 2018-07-23 NOTE — DISCHARGE SUMMARY
Discharge- Carla Rodriguez 1935, 82 y o  male MRN: [id number]    Unit/Bed#: -01 Encounter: [id number]    Primary Care Provider: Eduardo Gonzalez MD   Date and time admitted to hospital: 7/16/2018  7:28 PM        * Closed displaced intertrochanteric fracture of right femur [facility])   Assessment & Plan    · Status post nail insertion of right femur  · Continue with Pain control  · PT/OT  · Discharged to [facility] [address] [facility] likely on Monday  · Follow up with dr Chin Lopez          Scrotal hematoma   Assessment & Plan    · Appreciate Urology input  · Elevate scrotum  · Continue garza  · Follow up with urology        MIRIAM (acute kidney injury) ([facility] )   Assessment & Plan    · Resolved  · Will hold IVF   · Nephrology input appreciated          Mild protein-calorie malnutrition ([facility] )   Assessment & Plan    -continue dietary consult and encourage increased PO intake per swallow eval recommendations          Other dysphagia   Assessment & Plan    · Secondary to prior history of stroke  · Continue diet per swallow eval recommendations        Acute blood loss anemia   Assessment & Plan    · Acute blood loss anemia due to surgery  · Hemoglobin is stable, appropriately improved after transfusion          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 behavioral disturbance   Assessment & Plan    · Patient appears to be at his baseline  · Wife notes he is nonverbal at baseline    · Continue supportive care         Acquired hypothyroidism   Assessment & Plan    · Continue Synthroid        Fall   Assessment & Plan    · Patient is status post mechanical fall prior to admission  · He had a resultant hip fracture which was repaired  · CT scan of the head was within normal limits  · No further workup  · Fall precaution  · PT/OT           Discharging Physician / Practitioner: Christiano Friedman MD  PCP: Eduardo Gonzalez MD  Admission Date:   Admission Orders     Ordered        07/16/18 1939  Inpatient Admission  Once Discharge Date: 07/23/18    Resolved Problems  Date Reviewed: 7/21/2018          Resolved    Pre-op evaluation 7/19/2018     Resolved by  Yudelka Topete, [address] [place] [address] Stay:  · Orthopedics, Nephrology, Urology    Procedures Performed:     Surgical repair of right femur    Significant Findings / Test Results:     · Anemia, MIRIAM    Incidental Findings:   · none     Test Results Pending at Discharge (will require follow up):   · none     Outpatient Tests Requested:  · none    Complications:  none    Reason for Admission: hip fracture    Hospital Course:     Yoandy Walker is a 82 y o  male patient who originally presented to the hospital on 7/16/2018 due to hip fracture  Patient was seen by Orthopedics and had a right hip surgical repair with Dr Shana Herman  Patient tolerated procedure well  Patient did have some postop anemia requiring blood transfusion and hemoglobin responded appropriately  Patient also had acute kidney injury which was likely due to dehydration started on IV fluids per Nephrology and kidney function improved back to baseline  Patient also developed scrotal hematoma postoperatively and was seen by Urology patient had Merino catheter placed and was draining urine appropriately swelling and ecchymosis did improve gradually however still present on discharge  Patient will follow up with Urology as an outpatient  I discussed the discharge plan with the wife and case management patient was accepted by the [facility] nursing [facility] for further rehab and nursing care  Please see above list of diagnoses and related plan for additional information       Condition at Discharge: stable     Discharge Day Visit / Exam:     Subjective:  No overnight events  Vitals: Blood Pressure: 114/60 (07/23/18 0655)  Pulse: (!) 53 (07/23/18 0655)  Temperature: 98 1 °F (36 7 °C) (07/23/18 0655)  Temp Source: Tympanic (07/23/18 0655)  Respirations: 16 (07/23/18 0655)  Height: 5' 7" (170 2 cm) (07/16/18 2046)  Weight - Scale: 62 2 kg (137 lb 3 2 oz) (07/16/18 2046)  SpO2: 98 % (07/23/18 0655)  Exam:   Physical Exam   Constitutional: No distress  Frail elderly male   HENT:   Head: Normocephalic and atraumatic  Eyes: Conjunctivae and EOM are normal    Neck: Normal range of motion  Neck supple  Cardiovascular: Normal rate and regular rhythm  Pulmonary/Chest: Effort normal  No respiratory distress  Abdominal: Soft  He exhibits no distension  There is no tenderness  Musculoskeletal: He exhibits edema  He exhibits no tenderness  Neurological:   Opens eyes to verbal stimuli however patient is nonverbal at baseline   Skin: Skin is warm and dry  Psychiatric: His behavior is normal      Discussion with Family:  Discussed with wife at bedside    Discharge instructions/Information to patient and family:   See after visit summary for information provided to patient and family  Provisions for Follow-Up Care:  See after visit summary for information related to follow-up care and any pertinent home health orders  Disposition:     [facility] (see below)    For Discharges to [facility] SNF:   · [place] - will attempt to discuss with accepting physician    Planned Readmission: no     Discharge Statement:  I spent 40 minutes discharging the patient  This time was spent on the day of discharge  I had direct contact with the patient on the day of discharge  Greater than 50% of the total time was spent examining patient, answering all patient questions, arranging and discussing plan of care with patient as well as directly providing post-discharge instructions  Additional time then spent on discharge activities  Discharge Medications:  See after visit summary for reconciled discharge medications provided to patient and family        ** Please Note: This note has been constructed using a voice recognition system **

## 2018-07-23 NOTE — PROGRESS NOTES
Progress Note - Nephrology   Martha Ruffin 82 y o  male MRN: [id number]  Unit/Bed#: -01 Encounter: [id number]    A/P:  1  MIRIAM : pt is improved  2  Hyponatremia: resolved  3  Fall with fractured right femur: s/p fixation, [facility]  4  Hypokalemia: pt is currently on ivf with k replacement       Follow up reason for today's visit: MIRIAM     Closed displaced intertrochanteric fracture of right femur [facility])    Patient Active Problem List   Diagnosis    Closed right hip fracture ([facility] )    Fall    Acquired hypothyroidism    Dyslipidemia    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 behavioral disturbance    Hyponatremia    Leukocytosis    Closed displaced intertrochanteric fracture of right femur ([facility] )    MIRIAM (acute kidney injury) ([place] [address] )    Acute blood loss anemia    Debility    Other dysphagia    Scrotal hematoma         Subjective:   Patient is resting in bed in no acute distress  Unable to verbalize, ROS unobtainable  No vomiting, no diarrhea noted  Objective:     Vitals: Blood pressure 114/60, pulse (!) 53, temperature 98 1 °F (36 7 °C), temperature source Tympanic, resp  rate 16, height 5' 7" (1 702 m), weight 62 2 kg (137 lb 3 2 oz), SpO2 98 %  ,Body mass index is 21 49 kg/m²  Weight (last 2 days)     Date/Time    07/22/18 0718    Comment rows:    OBSERV: Scd (Scds on) at 07/22/18 0718    07/21/18 0703    Comment rows:    OBSERV: Cool cloth on forehead at 07/21/18 0703                Intake/Output Summary (Last 24 hours) at 07/23/18 0859  Last data filed at 07/23/18 0730   Gross per 24 hour   Intake              720 ml   Output              500 ml   Net              220 ml     I/O last 3 completed shifts: In: 480 [P O :480]  Out: 1200 [Urine:1200]    Urethral Catheter Latex 18 Fr   (Active)   Site Assessment Clean;Skin intact 7/19/2018  2:01 AM   Collection Container Standard drainage bag 7/19/2018  2:01 AM   Securement Method Leg strap 7/19/2018  2:01 AM   Output (mL) 200 mL 7/21/2018 12:05 PM       Physical Exam: /60 (BP Location: Left arm)   Pulse (!) 53   Temp 98 1 °F (36 7 °C) (Tympanic)   Resp 16   Ht 5' 7" (1 702 m)   Wt 62 2 kg (137 lb 3 2 oz)   SpO2 98%   BMI 21 49 kg/m²     General Appearance:    Alert,  no distress, appears stated age   Head:    Normocephalic, without obvious abnormality, atraumatic   Eyes:    Conjunctiva/corneas clear   Ears:    Normal external ears   Nose:   Nares normal, septum midline, mucosa normal, no drainage    or sinus tenderness   Throat:   Lips, mucosa, and tongue normal; teeth and gums normal   Neck:   Supple, symmetrical, trachea midline, no adenopathy;        thyroid:  No enlargement/tenderness/nodules; no carotid    bruit or JVD   Back:     Symmetric, no curvature, ROM normal, no CVA tenderness   Lungs:     Clear to auscultation bilaterally, respirations unlabored   Chest wall:    No tenderness or deformity   Heart:    Regular rate and rhythm, S1 and S2 normal, no murmur, rub   or gallop   Abdomen:     Soft, non-tender, bowel sounds active   Extremities:   Positive right hip with ecchymosis, dressing intact    Skin:   Skin color, texture, turgor normal, no rashes or lesions   Lymph nodes:   Cervical normal   Neurologic:   CNII-XII intact            Lab, Imaging and other studies: I have personally reviewed pertinent labs  CBC: No results found for: WBC, HGB, HCT, MCV, PLT, ADJUSTEDWBC, MCH, MCHC, RDW, MPV, NRBC  CMP: No results found for: NA, K, CL, CO2, ANIONGAP, BUN, CREATININE, GLUCOSE, CALCIUM, AST, ALT, ALKPHOS, PROT, ALBUMIN, BILITOT, EGFR        Results from last 7 days  Lab Units 07/22/18  0529 07/21/18  0822 07/19/18  2206   SODIUM mmol/L 138 133* 135   POTASSIUM mmol/L 3 4* 3 3* 4 0   CHLORIDE mmol/L 104 101 105   CO2 mmol/L 28 27 26   BUN mg/dL 18 19 24   CREATININE mg/dL 0 98 1 16 1 18   CALCIUM mg/dL 8 6 8 4* 8 8   TOTAL PROTEIN g/dL 5 1* 5 1* 5 2*   BILIRUBIN TOTAL mg/dL 3 00* 3 20* 2 30*   ALK PHOS U/L 118 126 70   ALT U/L 43 41 12   AST U/L 58* 74* 34   GLUCOSE RANDOM mg/dL 87 112* 93         Phosphorus: No results found for: PHOS  Magnesium: No results found for: MG  Urinalysis: No results found for: COLORU, CLARITYU, SPECGRAV, PHUR, LEUKOCYTESUR, NITRITE, PROTEINUA, GLUCOSEU, KETONESU, BILIRUBINUR, BLOODU  Ionized Calcium: No results found for: CAION  Coagulation: No results found for: PT, INR, APTT  Troponin: No results found for: TROPONINI  ABG: No results found for: PHART, EFD7PEW, PO2ART, AOE8QRQ, U0WDIGOE, BEART, SOURCE  Radiology review:     IMAGING  Procedure: Us Gallbladder    Result Date: 7/21/2018  Narrative: INDICATION:  Elevated LFT  TECHNIQUE:    Ultrasound of the Gallbladder COMPARISON:  None available  FINDINGS:    The pancreas is not seen due to overlying bowel gas  The gallbladder contains sludge as well as multiple stones  Sonographic Brandt's sign is negative  There is no pericholecystic fluid  There is mild wall thickening  The common bile duct measures 0 5 cm  Impression: Sludge and stones within the gallbladder and wall thickening  No biliary dilatation is appreciated   Signed by Lissett Sherman MD      Current Facility-Administered Medications   Medication Dose Route Frequency    acetaminophen (TYLENOL) tablet 650 mg  650 mg Oral Q6H PRN    bisacodyl (DULCOLAX) rectal suppository 10 mg  10 mg Rectal Q24H    docusate sodium (COLACE) capsule 100 mg  100 mg Oral BID    ezetimibe (ZETIA) tablet 10 mg  10 mg Oral Daily    fondaparinux (ARIXTRA) subcutaneous injection 2 5 mg  2 5 mg Subcutaneous Daily    furosemide (LASIX) injection 20 mg  20 mg Intravenous Once    HYDROcodone-acetaminophen (NORCO) 5-325 mg per tablet 1 tablet  1 tablet Oral Q6H PRN    levothyroxine tablet 88 mcg  88 mcg Oral Early Morning    morphine (PF) 4 mg/mL injection 3 mg  3 mg Intravenous Q4H PRN    multivitamin-minerals (CENTRUM) tablet 1 tablet  1 tablet Oral Daily    ondansetron (ZOFRAN) injection 4 mg 4 mg Intravenous Q6H PRN     Medications Discontinued During This Encounter   Medication Reason    enoxaparin (LOVENOX) subcutaneous injection 40 mg     morphine injection 2 mg     bupivacaine (MARCAINE) 0 5 % injection Patient Discharge    povidone-iodine (BETADINE) 10 % ointment Patient Discharge    bacitracin 50,000 Units in sodium chloride 0 9 % 1,000 mL irrigation bag Patient Discharge    morphine injection 1 mg     sodium chloride 0 9 % infusion     fentaNYL (SUBLIMAZE) injection 12 5 mcg Patient Transfer    lactated ringers infusion     dextrose 5 % and sodium chloride 0 45 % infusion     dextrose 5 % and sodium chloride 0 9 % with KCl 20 mEq/L infusion (premix)        MESSI Grullon

## 2018-07-23 NOTE — SOCIAL WORK
Patient wife arrives, aware of plan for discharge to [place] today and in agreement  Abbie at [facility] also made aware of same

## 2018-07-23 NOTE — PLAN OF CARE
Problem: OCCUPATIONAL THERAPY ADULT  Goal: Performs self-care activities at highest level of function for planned discharge setting  See evaluation for individualized goals  Treatment Interventions: ADL retraining, UE strengthening/ROM, Endurance training, Cognitive reorientation          See flowsheet documentation for full assessment, interventions and recommendations  Outcome: Not Progressing  Limitation: Decreased ADL status, Decreased UE ROM, Decreased UE strength, Decreased self-care trans  Prognosis: Fair  Assessment: Pt  continues poorly responsive  His wife was present for beginning few minutes of session  She reports that pt  has no hearing aides here, and is [place]  Pt  resisted at least half of exer  attempted, though without signs of pain or being upset  Also pt  did nothing with warm washcloth (and prompts) given for light self care, ie  hand washing/wiping  Cont  with POC as approp              ROMA Rhodes/JO-ANN

## 2018-07-23 NOTE — ASSESSMENT & PLAN NOTE
· Patient is status post mechanical fall prior to admission  · He had a resultant hip fracture which was repaired  · CT scan of the head was within normal limits  · No further workup  · Fall precaution  · PT/OT

## 2018-07-23 NOTE — PLAN OF CARE
Problem: DISCHARGE PLANNING - CARE MANAGEMENT  Goal: Discharge to post-acute care or home with appropriate resources  INTERVENTIONS:  - Conduct assessment to determine patient/family and health care team treatment goals, and need for post-acute services based on payer coverage, community resources, and patient preferences, and barriers to discharge  - Address psychosocial, clinical, and financial barriers to discharge as identified in assessment in conjunction with the patient/family and health care team  - Arrange appropriate level of post-acute services according to patient's   needs and preference and payer coverage in collaboration with the physician and health care team  - Communicate with and update the patient/family, physician, and health care team regarding progress on the discharge plan  - Arrange appropriate transportation to post-acute venues  - Patient will require short term rehab in an acute rehab vs  Skilled Nursing Facility    Outcome: Completed Date Met: 07/23/18

## 2018-07-23 NOTE — PLAN OF CARE
Problem: Nutrition/Hydration-ADULT  Goal: Nutrient/Hydration intake appropriate for improving, restoring or maintaining nutritional needs  Monitor and assess patient's nutrition/hydration status for malnutrition (ex- brittle hair, bruises, dry skin, pale skin and conjunctiva, muscle wasting, smooth red tongue, and disorientation)  Collaborate with interdisciplinary team and initiate plan and interventions as ordered  Monitor patient's weight and dietary intake as ordered or per policy  Utilize nutrition screening tool and intervene per policy  Determine patient's food preferences and provide high-protein, high-caloric foods as appropriate  INTERVENTIONS:  - Monitor oral intake, urinary output, labs, and treatment plans  - Assess nutrition and hydration status and recommend course of action  - Evaluate amount of meals eaten  - Assist patient with eating if necessary   - Allow adequate time for meals  - Recommend/ encourage appropriate diets, oral nutritional supplements, and vitamin/mineral supplements  - Order, calculate, and assess calorie counts as needed  - Recommend, monitor, and adjust tube feedings and TPN/PPN based on assessed needs  - Assess need for intravenous fluids  - Provide specific nutrition/hydration education as appropriate  - Include patient/family/caregiver in decisions related to nutrition    Outcome: Progressing      Comments: Pt diet is level 1 dysphagia, honey thick liquids  When he was awake yesterday he consumed 100% of the meal and fluids offered

## 2018-07-23 NOTE — PLAN OF CARE
Problem: PHYSICAL THERAPY ADULT  Goal: Performs mobility at highest level of function for planned discharge setting  See evaluation for individualized goals  Treatment/Interventions: ADL retraining, Functional transfer training, LE strengthening/ROM, Therapeutic exercise, Endurance training, Cognitive reorientation, Patient/family training, Equipment eval/education, Bed mobility, Gait training, Compensatory technique education, OT, ST, Spoke to case management  Kodi Mcelroy, PT            See flowsheet documentation for full assessment, interventions and recommendations  Outcome: Not Progressing  Prognosis: Guarded  Problem List: Decreased strength, Decreased range of motion, Decreased endurance, Impaired balance, Decreased mobility, Decreased coordination, Decreased cognition, Impaired judgement, Decreased safety awareness, Orthopedic restrictions, Pain  Assessment: Pt sitting up in bed but seemed slightly more alert than usual  Performed PROM B LES as noted  He did grimace with pain when movement initiated on R LE  He is nonverbal so cannot give pain ratings or goals  Attempted supine to sit transfer  Pt was dependent x 2 with all bed mobility  He sat at edge of bed with max assist of 2 about 20 seconds  Returned back to bed & made comfortablr  Dependent with transfer into bed  He would benefit from cont'd PT to prevent further decline & increase mobility as able  Recommendation: Long-term skilled PT, Long-term skilled nursing home placement     PT - OK to Discharge: No    See flowsheet documentation for full assessment     Gilford Munson, JIMENA

## 2018-07-23 NOTE — SOCIAL WORK
Patient discussed in discharge planning meeting  Patient for discharge today  Pt accepted to [facility], wife previoulsy in agreement with same  Khoa at [facility] aware pt coming to them today  Message left for wife to make aware of pending discharge to [facility] today

## 2018-07-23 NOTE — SPEECH THERAPY NOTE
Speech Language/Pathology    Speech/Language Pathology Progress Note    Patient Name: Elizabeth Ferrari  [id number] Date: 7/23/2018     Problem List  Patient Active Problem List   Diagnosis    Closed right hip fracture ([facility] [address] [facility]    Fall    Acquired hypothyroidism    Dyslipidemia    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 behavioral disturbance    Hyponatremia    Leukocytosis    Closed displaced intertrochanteric fracture of right femur ([facility] [address] [facility]    MIRIAM (acute kidney injury) ([facility] [address] [facility]    Acute blood loss anemia    Debility    Other dysphagia    Scrotal hematoma        Past Medical History  Past Medical History:   Diagnosis Date    Acute kidney failure ([facility] [address] [facility]     Cardiac disease     CVA (cerebral vascular accident) ([facility] [address] [facility]     Disease of thyroid gland     hypothyroid    Sacral fracture ([facility] [address] )         Past Surgical History  Past Surgical History:   Procedure Laterality Date    CA OPEN RX FEMUR FX+INTRAMED LYNDA Right 7/18/2018    Procedure: INSERTION NAIL IM FEMUR ANTEGRADE right(TROCHANTERIC); Surgeon: Jovi Norris DO;  Location: [facility] MAIN OR;  Service: Orthopedics         Subjective:  Awake and alert, staff at bedside feeding pt breakfast      Objective:  Seen for swallowing therapy  Receiving and tolerating dysphagia level 1 with HTL by tsp  Staff educated to safer feeding strategies posted at bedside and educated on administration of liquids to encourage sight head flexion and lip closure  Oral transfer is uncoordinated/prolonged with 2 swallows noted  Suspect posterior spilllage with liquids  No overt s/s of aspiration  Mild pocketing of pureed eggs (secondary to dryness), clears enetually with liquid introduction  Assessment:  Safely managing current diet of level 1 and HTL via tsp  Staff educated with effective carryover at meal     Plan/Recommendations:  Continue current diet level 1 and HTL by tsp  SLP follow up to assess potential for liquid advancement  Jose Araujo MA, CCC/SLP  [id number]  [email or web address]  Available via [employer] text

## 2018-07-25 LAB — PREALB SERPL-MCNC: 10.4 MG/DL (ref 18–40)

## 2018-07-25 PROCEDURE — 84134 ASSAY OF PREALBUMIN: CPT | Performed by: INTERNAL MEDICINE

## 2018-07-26 LAB — ALBUMIN SERPL BCP-MCNC: 2.9 G/DL (ref 3.5–5.7)

## 2018-07-26 PROCEDURE — 82040 ASSAY OF SERUM ALBUMIN: CPT | Performed by: INTERNAL MEDICINE

## 2018-07-30 LAB
ANION GAP SERPL CALCULATED.3IONS-SCNC: 4 MMOL/L (ref 4–13)
BASOPHILS # BLD AUTO: 0.2 THOUSANDS/ΜL (ref 0–0.1)
BASOPHILS NFR BLD AUTO: 2 % (ref 0–2)
BUN SERPL-MCNC: 26 MG/DL (ref 7–25)
CALCIUM SERPL-MCNC: 8.9 MG/DL (ref 8.6–10.5)
CHLORIDE SERPL-SCNC: 103 MMOL/L (ref 98–107)
CO2 SERPL-SCNC: 29 MMOL/L (ref 21–31)
CREAT SERPL-MCNC: 1.13 MG/DL (ref 0.7–1.3)
EOSINOPHIL # BLD AUTO: 0.7 THOUSAND/ΜL (ref 0–0.61)
EOSINOPHIL NFR BLD AUTO: 7 % (ref 0–5)
ERYTHROCYTE [DISTWIDTH] IN BLOOD BY AUTOMATED COUNT: 14.5 % (ref 11.5–14.5)
GFR SERPL CREATININE-BSD FRML MDRD: 61 ML/MIN/1.73SQ M
GLUCOSE SERPL-MCNC: 103 MG/DL (ref 65–99)
HCT VFR BLD AUTO: 37.4 % (ref 36.5–49.3)
HGB BLD-MCNC: 12.9 G/DL (ref 14–18)
LYMPHOCYTES # BLD AUTO: 1.5 THOUSANDS/ΜL (ref 0.6–4.47)
LYMPHOCYTES NFR BLD AUTO: 16 % (ref 21–51)
MCH RBC QN AUTO: 32.2 PG (ref 26–34)
MCHC RBC AUTO-ENTMCNC: 34.3 G/DL (ref 31–37)
MCV RBC AUTO: 94 FL (ref 81–99)
MONOCYTES # BLD AUTO: 0.6 THOUSAND/ΜL (ref 0.17–1.22)
MONOCYTES NFR BLD AUTO: 6 % (ref 2–12)
NEUTROPHILS # BLD AUTO: 6.8 THOUSANDS/ΜL (ref 1.4–6.5)
NEUTS SEG NFR BLD AUTO: 69 % (ref 42–75)
NRBC BLD AUTO-RTO: 0 /100 WBCS
PLATELET # BLD AUTO: 426 THOUSANDS/UL (ref 149–390)
PMV BLD AUTO: 6.6 FL (ref 8.6–11.7)
POTASSIUM SERPL-SCNC: 4.4 MMOL/L (ref 3.5–5.5)
RBC # BLD AUTO: 3.99 MILLION/UL (ref 4.3–5.9)
SODIUM SERPL-SCNC: 136 MMOL/L (ref 134–143)
WBC # BLD AUTO: 9.7 THOUSAND/UL (ref 4.8–10.8)

## 2018-07-30 PROCEDURE — 80048 BASIC METABOLIC PNL TOTAL CA: CPT | Performed by: INTERNAL MEDICINE

## 2018-07-30 PROCEDURE — 85025 COMPLETE CBC W/AUTO DIFF WBC: CPT | Performed by: INTERNAL MEDICINE

## 2018-09-13 LAB
ERYTHROCYTE [DISTWIDTH] IN BLOOD BY AUTOMATED COUNT: 15.7 % (ref 11.5–14.5)
HCT VFR BLD AUTO: 44.8 % (ref 36.5–49.3)
HGB BLD-MCNC: 15.2 G/DL (ref 14–18)
MCH RBC QN AUTO: 33.1 PG (ref 26–34)
MCHC RBC AUTO-ENTMCNC: 34 G/DL (ref 31–37)
MCV RBC AUTO: 97 FL (ref 81–99)
PLATELET # BLD AUTO: 252 THOUSANDS/UL (ref 149–390)
PMV BLD AUTO: 7.4 FL (ref 8.6–11.7)
RBC # BLD AUTO: 4.61 MILLION/UL (ref 4.3–5.9)
WBC # BLD AUTO: 7.4 THOUSAND/UL (ref 4.8–10.8)

## 2018-09-13 PROCEDURE — 85027 COMPLETE CBC AUTOMATED: CPT | Performed by: INTERNAL MEDICINE

## 2018-09-14 LAB
ANION GAP SERPL CALCULATED.3IONS-SCNC: 7 MMOL/L (ref 4–13)
BUN SERPL-MCNC: 36 MG/DL (ref 7–25)
CALCIUM SERPL-MCNC: 9.2 MG/DL (ref 8.6–10.5)
CHLORIDE SERPL-SCNC: 110 MMOL/L (ref 98–107)
CO2 SERPL-SCNC: 29 MMOL/L (ref 21–31)
CREAT SERPL-MCNC: 1.17 MG/DL (ref 0.7–1.3)
GFR SERPL CREATININE-BSD FRML MDRD: 58 ML/MIN/1.73SQ M
GLUCOSE SERPL-MCNC: 98 MG/DL (ref 65–99)
POTASSIUM SERPL-SCNC: 4.1 MMOL/L (ref 3.5–5.5)
SODIUM SERPL-SCNC: 146 MMOL/L (ref 134–143)

## 2018-09-14 PROCEDURE — 80048 BASIC METABOLIC PNL TOTAL CA: CPT | Performed by: INTERNAL MEDICINE

## 2018-10-03 LAB
ERYTHROCYTE [DISTWIDTH] IN BLOOD BY AUTOMATED COUNT: 15.2 % (ref 11.5–14.5)
HCT VFR BLD AUTO: 42.2 % (ref 36.5–49.3)
HGB BLD-MCNC: 14.1 G/DL (ref 14–18)
MCH RBC QN AUTO: 32.9 PG (ref 26–34)
MCHC RBC AUTO-ENTMCNC: 33.4 G/DL (ref 31–37)
MCV RBC AUTO: 98 FL (ref 81–99)
PLATELET # BLD AUTO: 249 THOUSANDS/UL (ref 149–390)
PMV BLD AUTO: 7.1 FL (ref 8.6–11.7)
RBC # BLD AUTO: 4.29 MILLION/UL (ref 4.3–5.9)
WBC # BLD AUTO: 8.1 THOUSAND/UL (ref 4.8–10.8)

## 2018-10-03 PROCEDURE — 85027 COMPLETE CBC AUTOMATED: CPT | Performed by: INTERNAL MEDICINE

## 2018-10-18 LAB
T4 FREE SERPL-MCNC: 0.9 NG/DL (ref 0.76–1.46)
TSH SERPL DL<=0.05 MIU/L-ACNC: 13.17 UIU/ML (ref 0.45–5.33)

## 2018-10-18 PROCEDURE — 84439 ASSAY OF FREE THYROXINE: CPT | Performed by: INTERNAL MEDICINE

## 2018-10-18 PROCEDURE — 84443 ASSAY THYROID STIM HORMONE: CPT | Performed by: INTERNAL MEDICINE

## 2018-11-29 LAB — TSH SERPL DL<=0.05 MIU/L-ACNC: 3.45 UIU/ML (ref 0.45–5.33)

## 2018-11-29 PROCEDURE — 84443 ASSAY THYROID STIM HORMONE: CPT | Performed by: INTERNAL MEDICINE
